# Patient Record
Sex: FEMALE | Race: WHITE | Employment: OTHER
[De-identification: names, ages, dates, MRNs, and addresses within clinical notes are randomized per-mention and may not be internally consistent; named-entity substitution may affect disease eponyms.]

---

## 2017-06-16 ENCOUNTER — SURGERY (OUTPATIENT)
Age: 72
End: 2017-06-16

## 2017-06-16 ENCOUNTER — ANESTHESIA (OUTPATIENT)
Dept: SURGERY | Facility: CLINIC | Age: 72
DRG: 329 | End: 2017-06-16
Payer: MEDICARE

## 2017-06-16 ENCOUNTER — APPOINTMENT (OUTPATIENT)
Dept: CT IMAGING | Facility: CLINIC | Age: 72
DRG: 329 | End: 2017-06-16
Attending: EMERGENCY MEDICINE
Payer: MEDICARE

## 2017-06-16 ENCOUNTER — HOSPITAL ENCOUNTER (INPATIENT)
Facility: CLINIC | Age: 72
LOS: 4 days | Discharge: HOME | DRG: 329 | End: 2017-06-20
Attending: EMERGENCY MEDICINE | Admitting: SURGERY
Payer: MEDICARE

## 2017-06-16 ENCOUNTER — ANESTHESIA EVENT (OUTPATIENT)
Dept: SURGERY | Facility: CLINIC | Age: 72
DRG: 329 | End: 2017-06-16
Payer: MEDICARE

## 2017-06-16 DIAGNOSIS — K35.30 ACUTE APPENDICITIS WITH LOCALIZED PERITONITIS: ICD-10-CM

## 2017-06-16 PROBLEM — K35.32 RUPTURED APPENDICITIS: Status: ACTIVE | Noted: 2017-06-16

## 2017-06-16 LAB
ANION GAP SERPL CALCULATED.3IONS-SCNC: 11 MMOL/L (ref 3–14)
BASOPHILS # BLD AUTO: 0 10E9/L (ref 0–0.2)
BASOPHILS NFR BLD AUTO: 0.1 %
BUN SERPL-MCNC: 15 MG/DL (ref 7–30)
CALCIUM SERPL-MCNC: 8.8 MG/DL (ref 8.5–10.1)
CHLORIDE SERPL-SCNC: 107 MMOL/L (ref 94–109)
CO2 SERPL-SCNC: 23 MMOL/L (ref 20–32)
CREAT SERPL-MCNC: 0.89 MG/DL (ref 0.52–1.04)
DIFFERENTIAL METHOD BLD: ABNORMAL
EOSINOPHIL # BLD AUTO: 0 10E9/L (ref 0–0.7)
EOSINOPHIL NFR BLD AUTO: 0 %
ERYTHROCYTE [DISTWIDTH] IN BLOOD BY AUTOMATED COUNT: 13 % (ref 10–15)
GFR SERPL CREATININE-BSD FRML MDRD: 62 ML/MIN/1.7M2
GLUCOSE SERPL-MCNC: 172 MG/DL (ref 70–99)
HCT VFR BLD AUTO: 41.2 % (ref 35–47)
HGB BLD-MCNC: 14 G/DL (ref 11.7–15.7)
IMM GRANULOCYTES # BLD: 0 10E9/L (ref 0–0.4)
IMM GRANULOCYTES NFR BLD: 0.4 %
LYMPHOCYTES # BLD AUTO: 0.5 10E9/L (ref 0.8–5.3)
LYMPHOCYTES NFR BLD AUTO: 5.5 %
MCH RBC QN AUTO: 32.4 PG (ref 26.5–33)
MCHC RBC AUTO-ENTMCNC: 34 G/DL (ref 31.5–36.5)
MCV RBC AUTO: 95 FL (ref 78–100)
MONOCYTES # BLD AUTO: 0.7 10E9/L (ref 0–1.3)
MONOCYTES NFR BLD AUTO: 9 %
NEUTROPHILS # BLD AUTO: 7 10E9/L (ref 1.6–8.3)
NEUTROPHILS NFR BLD AUTO: 85 %
NRBC # BLD AUTO: 0 10*3/UL
NRBC BLD AUTO-RTO: 0 /100
PLATELET # BLD AUTO: 170 10E9/L (ref 150–450)
POTASSIUM SERPL-SCNC: 3.9 MMOL/L (ref 3.4–5.3)
RADIOLOGIST FLAGS: ABNORMAL
RBC # BLD AUTO: 4.32 10E12/L (ref 3.8–5.2)
SODIUM SERPL-SCNC: 141 MMOL/L (ref 133–144)
WBC # BLD AUTO: 8.3 10E9/L (ref 4–11)

## 2017-06-16 PROCEDURE — 71000012 ZZH RECOVERY PHASE 1 LEVEL 1 FIRST HR: Performed by: SURGERY

## 2017-06-16 PROCEDURE — 40000169 ZZH STATISTIC PRE-PROCEDURE ASSESSMENT I: Performed by: SURGERY

## 2017-06-16 PROCEDURE — 99285 EMERGENCY DEPT VISIT HI MDM: CPT | Mod: 25

## 2017-06-16 PROCEDURE — 25000128 H RX IP 250 OP 636: Performed by: EMERGENCY MEDICINE

## 2017-06-16 PROCEDURE — 25800025 ZZH RX 258: Performed by: SURGERY

## 2017-06-16 PROCEDURE — 27210995 ZZH RX 272: Performed by: SURGERY

## 2017-06-16 PROCEDURE — 25000125 ZZHC RX 250: Performed by: PHYSICIAN ASSISTANT

## 2017-06-16 PROCEDURE — 37000008 ZZH ANESTHESIA TECHNICAL FEE, 1ST 30 MIN: Performed by: SURGERY

## 2017-06-16 PROCEDURE — 25000125 ZZHC RX 250: Performed by: EMERGENCY MEDICINE

## 2017-06-16 PROCEDURE — 25000125 ZZHC RX 250: Performed by: NURSE ANESTHETIST, CERTIFIED REGISTERED

## 2017-06-16 PROCEDURE — 12000007 ZZH R&B INTERMEDIATE

## 2017-06-16 PROCEDURE — 88307 TISSUE EXAM BY PATHOLOGIST: CPT | Mod: 26 | Performed by: SURGERY

## 2017-06-16 PROCEDURE — 25800025 ZZH RX 258: Performed by: EMERGENCY MEDICINE

## 2017-06-16 PROCEDURE — 74177 CT ABD & PELVIS W/CONTRAST: CPT

## 2017-06-16 PROCEDURE — 96361 HYDRATE IV INFUSION ADD-ON: CPT

## 2017-06-16 PROCEDURE — 25000128 H RX IP 250 OP 636: Performed by: PHYSICIAN ASSISTANT

## 2017-06-16 PROCEDURE — 27210794 ZZH OR GENERAL SUPPLY STERILE: Performed by: SURGERY

## 2017-06-16 PROCEDURE — A9270 NON-COVERED ITEM OR SERVICE: HCPCS | Mod: GY | Performed by: PHYSICIAN ASSISTANT

## 2017-06-16 PROCEDURE — 88307 TISSUE EXAM BY PATHOLOGIST: CPT | Performed by: SURGERY

## 2017-06-16 PROCEDURE — 0DTH0ZZ RESECTION OF CECUM, OPEN APPROACH: ICD-10-PCS | Performed by: SURGERY

## 2017-06-16 PROCEDURE — 36000058 ZZH SURGERY LEVEL 3 EA 15 ADDTL MIN: Performed by: SURGERY

## 2017-06-16 PROCEDURE — 0DJD4ZZ INSPECTION OF LOWER INTESTINAL TRACT, PERCUTANEOUS ENDOSCOPIC APPROACH: ICD-10-PCS | Performed by: SURGERY

## 2017-06-16 PROCEDURE — 96374 THER/PROPH/DIAG INJ IV PUSH: CPT | Mod: 59

## 2017-06-16 PROCEDURE — 44160 REMOVAL OF COLON: CPT | Mod: AS | Performed by: PHYSICIAN ASSISTANT

## 2017-06-16 PROCEDURE — 36000056 ZZH SURGERY LEVEL 3 1ST 30 MIN: Performed by: SURGERY

## 2017-06-16 PROCEDURE — 25000125 ZZHC RX 250: Performed by: SURGERY

## 2017-06-16 PROCEDURE — 80048 BASIC METABOLIC PNL TOTAL CA: CPT | Performed by: EMERGENCY MEDICINE

## 2017-06-16 PROCEDURE — 44160 REMOVAL OF COLON: CPT | Performed by: SURGERY

## 2017-06-16 PROCEDURE — 96375 TX/PRO/DX INJ NEW DRUG ADDON: CPT

## 2017-06-16 PROCEDURE — 25000566 ZZH SEVOFLURANE, EA 15 MIN: Performed by: SURGERY

## 2017-06-16 PROCEDURE — S0028 INJECTION, FAMOTIDINE, 20 MG: HCPCS | Performed by: PHYSICIAN ASSISTANT

## 2017-06-16 PROCEDURE — 25000128 H RX IP 250 OP 636: Performed by: NURSE ANESTHETIST, CERTIFIED REGISTERED

## 2017-06-16 PROCEDURE — 25800025 ZZH RX 258: Performed by: PHYSICIAN ASSISTANT

## 2017-06-16 PROCEDURE — 25000128 H RX IP 250 OP 636: Performed by: ANESTHESIOLOGY

## 2017-06-16 PROCEDURE — 37000009 ZZH ANESTHESIA TECHNICAL FEE, EACH ADDTL 15 MIN: Performed by: SURGERY

## 2017-06-16 PROCEDURE — 25000132 ZZH RX MED GY IP 250 OP 250 PS 637: Mod: GY | Performed by: PHYSICIAN ASSISTANT

## 2017-06-16 PROCEDURE — 85025 COMPLETE CBC W/AUTO DIFF WBC: CPT | Performed by: EMERGENCY MEDICINE

## 2017-06-16 RX ORDER — DEXTROSE MONOHYDRATE, SODIUM CHLORIDE, AND POTASSIUM CHLORIDE 50; 1.49; 4.5 G/1000ML; G/1000ML; G/1000ML
INJECTION, SOLUTION INTRAVENOUS CONTINUOUS
Status: DISCONTINUED | OUTPATIENT
Start: 2017-06-16 | End: 2017-06-19

## 2017-06-16 RX ORDER — ONDANSETRON 2 MG/ML
4 INJECTION INTRAMUSCULAR; INTRAVENOUS EVERY 6 HOURS PRN
Status: CANCELLED | OUTPATIENT
Start: 2017-06-16

## 2017-06-16 RX ORDER — SODIUM CHLORIDE, SODIUM LACTATE, POTASSIUM CHLORIDE, CALCIUM CHLORIDE 600; 310; 30; 20 MG/100ML; MG/100ML; MG/100ML; MG/100ML
INJECTION, SOLUTION INTRAVENOUS CONTINUOUS
Status: DISCONTINUED | OUTPATIENT
Start: 2017-06-16 | End: 2017-06-16 | Stop reason: HOSPADM

## 2017-06-16 RX ORDER — NALOXONE HYDROCHLORIDE 0.4 MG/ML
.1-.4 INJECTION, SOLUTION INTRAMUSCULAR; INTRAVENOUS; SUBCUTANEOUS
Status: DISCONTINUED | OUTPATIENT
Start: 2017-06-16 | End: 2017-06-20 | Stop reason: HOSPADM

## 2017-06-16 RX ORDER — DEXTROSE MONOHYDRATE, SODIUM CHLORIDE, AND POTASSIUM CHLORIDE 50; 1.49; 4.5 G/1000ML; G/1000ML; G/1000ML
INJECTION, SOLUTION INTRAVENOUS ONCE
Status: COMPLETED | OUTPATIENT
Start: 2017-06-16 | End: 2017-06-16

## 2017-06-16 RX ORDER — DEXAMETHASONE SODIUM PHOSPHATE 4 MG/ML
INJECTION, SOLUTION INTRA-ARTICULAR; INTRALESIONAL; INTRAMUSCULAR; INTRAVENOUS; SOFT TISSUE PRN
Status: DISCONTINUED | OUTPATIENT
Start: 2017-06-16 | End: 2017-06-16

## 2017-06-16 RX ORDER — HYDROCODONE BITARTRATE AND ACETAMINOPHEN 5; 325 MG/1; MG/1
1-2 TABLET ORAL EVERY 4 HOURS PRN
Status: DISCONTINUED | OUTPATIENT
Start: 2017-06-16 | End: 2017-06-19

## 2017-06-16 RX ORDER — ONDANSETRON 2 MG/ML
4 INJECTION INTRAMUSCULAR; INTRAVENOUS EVERY 30 MIN PRN
Status: DISCONTINUED | OUTPATIENT
Start: 2017-06-16 | End: 2017-06-16 | Stop reason: HOSPADM

## 2017-06-16 RX ORDER — HYDROMORPHONE HYDROCHLORIDE 1 MG/ML
0.2 INJECTION, SOLUTION INTRAMUSCULAR; INTRAVENOUS; SUBCUTANEOUS
Status: CANCELLED | OUTPATIENT
Start: 2017-06-16

## 2017-06-16 RX ORDER — ONDANSETRON 2 MG/ML
INJECTION INTRAMUSCULAR; INTRAVENOUS PRN
Status: DISCONTINUED | OUTPATIENT
Start: 2017-06-16 | End: 2017-06-16

## 2017-06-16 RX ORDER — OXYCODONE AND ACETAMINOPHEN 5; 325 MG/1; MG/1
1-2 TABLET ORAL EVERY 4 HOURS PRN
Status: CANCELLED | OUTPATIENT
Start: 2017-06-16

## 2017-06-16 RX ORDER — MAGNESIUM HYDROXIDE 1200 MG/15ML
LIQUID ORAL PRN
Status: DISCONTINUED | OUTPATIENT
Start: 2017-06-16 | End: 2017-06-16 | Stop reason: HOSPADM

## 2017-06-16 RX ORDER — MULTIPLE VITAMINS W/ MINERALS TAB 9MG-400MCG
1 TAB ORAL DAILY
COMMUNITY

## 2017-06-16 RX ORDER — PROCHLORPERAZINE MALEATE 5 MG
5 TABLET ORAL EVERY 6 HOURS PRN
Status: CANCELLED | OUTPATIENT
Start: 2017-06-16

## 2017-06-16 RX ORDER — PROPOFOL 10 MG/ML
INJECTION, EMULSION INTRAVENOUS PRN
Status: DISCONTINUED | OUTPATIENT
Start: 2017-06-16 | End: 2017-06-16

## 2017-06-16 RX ORDER — HYDROMORPHONE HYDROCHLORIDE 1 MG/ML
0.2 INJECTION, SOLUTION INTRAMUSCULAR; INTRAVENOUS; SUBCUTANEOUS
Status: DISCONTINUED | OUTPATIENT
Start: 2017-06-16 | End: 2017-06-16

## 2017-06-16 RX ORDER — MULTIVITAMIN,THERAPEUTIC
1 TABLET ORAL DAILY
COMMUNITY
End: 2017-06-16

## 2017-06-16 RX ORDER — LIDOCAINE HYDROCHLORIDE 20 MG/ML
INJECTION, SOLUTION INFILTRATION; PERINEURAL PRN
Status: DISCONTINUED | OUTPATIENT
Start: 2017-06-16 | End: 2017-06-16

## 2017-06-16 RX ORDER — ONDANSETRON 4 MG/1
4 TABLET, ORALLY DISINTEGRATING ORAL EVERY 6 HOURS PRN
Status: DISCONTINUED | OUTPATIENT
Start: 2017-06-16 | End: 2017-06-20 | Stop reason: HOSPADM

## 2017-06-16 RX ORDER — HYDROMORPHONE HYDROCHLORIDE 1 MG/ML
.3-.5 INJECTION, SOLUTION INTRAMUSCULAR; INTRAVENOUS; SUBCUTANEOUS EVERY 5 MIN PRN
Status: DISCONTINUED | OUTPATIENT
Start: 2017-06-16 | End: 2017-06-16 | Stop reason: HOSPADM

## 2017-06-16 RX ORDER — FENTANYL CITRATE 50 UG/ML
INJECTION, SOLUTION INTRAMUSCULAR; INTRAVENOUS PRN
Status: DISCONTINUED | OUTPATIENT
Start: 2017-06-16 | End: 2017-06-16

## 2017-06-16 RX ORDER — BUPIVACAINE HYDROCHLORIDE AND EPINEPHRINE 5; 5 MG/ML; UG/ML
INJECTION, SOLUTION PERINEURAL PRN
Status: DISCONTINUED | OUTPATIENT
Start: 2017-06-16 | End: 2017-06-16 | Stop reason: HOSPADM

## 2017-06-16 RX ORDER — HYDROXYZINE HYDROCHLORIDE 10 MG/1
10 TABLET, FILM COATED ORAL EVERY 6 HOURS PRN
Status: DISCONTINUED | OUTPATIENT
Start: 2017-06-16 | End: 2017-06-20 | Stop reason: HOSPADM

## 2017-06-16 RX ORDER — PROCHLORPERAZINE MALEATE 5 MG
5 TABLET ORAL EVERY 6 HOURS PRN
Status: DISCONTINUED | OUTPATIENT
Start: 2017-06-16 | End: 2017-06-20 | Stop reason: HOSPADM

## 2017-06-16 RX ORDER — FENTANYL CITRATE 50 UG/ML
25-50 INJECTION, SOLUTION INTRAMUSCULAR; INTRAVENOUS
Status: DISCONTINUED | OUTPATIENT
Start: 2017-06-16 | End: 2017-06-16 | Stop reason: HOSPADM

## 2017-06-16 RX ORDER — IOPAMIDOL 755 MG/ML
75 INJECTION, SOLUTION INTRAVASCULAR ONCE
Status: COMPLETED | OUTPATIENT
Start: 2017-06-16 | End: 2017-06-16

## 2017-06-16 RX ORDER — AMOXICILLIN 250 MG
1-2 CAPSULE ORAL 2 TIMES DAILY
Status: DISCONTINUED | OUTPATIENT
Start: 2017-06-16 | End: 2017-06-20 | Stop reason: HOSPADM

## 2017-06-16 RX ORDER — ERTAPENEM 1 G/1
1 INJECTION, POWDER, LYOPHILIZED, FOR SOLUTION INTRAMUSCULAR; INTRAVENOUS ONCE
Status: COMPLETED | OUTPATIENT
Start: 2017-06-16 | End: 2017-06-16

## 2017-06-16 RX ORDER — NEOSTIGMINE METHYLSULFATE 1 MG/ML
VIAL (ML) INJECTION PRN
Status: DISCONTINUED | OUTPATIENT
Start: 2017-06-16 | End: 2017-06-16

## 2017-06-16 RX ORDER — GLYCOPYRROLATE 0.2 MG/ML
INJECTION, SOLUTION INTRAMUSCULAR; INTRAVENOUS PRN
Status: DISCONTINUED | OUTPATIENT
Start: 2017-06-16 | End: 2017-06-16

## 2017-06-16 RX ORDER — ONDANSETRON 4 MG/1
4 TABLET, ORALLY DISINTEGRATING ORAL EVERY 30 MIN PRN
Status: DISCONTINUED | OUTPATIENT
Start: 2017-06-16 | End: 2017-06-16 | Stop reason: HOSPADM

## 2017-06-16 RX ORDER — ERTAPENEM 1 G/1
1 INJECTION, POWDER, LYOPHILIZED, FOR SOLUTION INTRAMUSCULAR; INTRAVENOUS EVERY 24 HOURS
Status: DISCONTINUED | OUTPATIENT
Start: 2017-06-17 | End: 2017-06-19

## 2017-06-16 RX ORDER — EPHEDRINE SULFATE 50 MG/ML
INJECTION, SOLUTION INTRAMUSCULAR; INTRAVENOUS; SUBCUTANEOUS PRN
Status: DISCONTINUED | OUTPATIENT
Start: 2017-06-16 | End: 2017-06-16

## 2017-06-16 RX ORDER — METOCLOPRAMIDE HYDROCHLORIDE 5 MG/ML
5 INJECTION INTRAMUSCULAR; INTRAVENOUS EVERY 6 HOURS PRN
Status: CANCELLED | OUTPATIENT
Start: 2017-06-16

## 2017-06-16 RX ORDER — ONDANSETRON 2 MG/ML
4 INJECTION INTRAMUSCULAR; INTRAVENOUS EVERY 6 HOURS PRN
Status: DISCONTINUED | OUTPATIENT
Start: 2017-06-16 | End: 2017-06-20 | Stop reason: HOSPADM

## 2017-06-16 RX ORDER — ONDANSETRON 4 MG/1
4 TABLET, ORALLY DISINTEGRATING ORAL EVERY 6 HOURS PRN
Status: CANCELLED | OUTPATIENT
Start: 2017-06-16

## 2017-06-16 RX ORDER — NALOXONE HYDROCHLORIDE 0.4 MG/ML
.1-.4 INJECTION, SOLUTION INTRAMUSCULAR; INTRAVENOUS; SUBCUTANEOUS
Status: CANCELLED | OUTPATIENT
Start: 2017-06-16

## 2017-06-16 RX ORDER — SODIUM CHLORIDE, SODIUM LACTATE, POTASSIUM CHLORIDE, CALCIUM CHLORIDE 600; 310; 30; 20 MG/100ML; MG/100ML; MG/100ML; MG/100ML
INJECTION, SOLUTION INTRAVENOUS CONTINUOUS
Status: CANCELLED | OUTPATIENT
Start: 2017-06-16

## 2017-06-16 RX ORDER — LIDOCAINE 40 MG/G
CREAM TOPICAL
Status: DISCONTINUED | OUTPATIENT
Start: 2017-06-16 | End: 2017-06-20 | Stop reason: HOSPADM

## 2017-06-16 RX ORDER — METOCLOPRAMIDE 5 MG/1
5 TABLET ORAL EVERY 6 HOURS PRN
Status: CANCELLED | OUTPATIENT
Start: 2017-06-16

## 2017-06-16 RX ORDER — PROCHLORPERAZINE 25 MG
12.5 SUPPOSITORY, RECTAL RECTAL EVERY 12 HOURS PRN
Status: CANCELLED | OUTPATIENT
Start: 2017-06-16

## 2017-06-16 RX ORDER — VITAMIN E 268 MG
400 CAPSULE ORAL DAILY
COMMUNITY

## 2017-06-16 RX ORDER — VECURONIUM BROMIDE 1 MG/ML
INJECTION, POWDER, LYOPHILIZED, FOR SOLUTION INTRAVENOUS PRN
Status: DISCONTINUED | OUTPATIENT
Start: 2017-06-16 | End: 2017-06-16

## 2017-06-16 RX ORDER — ONDANSETRON 2 MG/ML
4 INJECTION INTRAMUSCULAR; INTRAVENOUS ONCE
Status: COMPLETED | OUTPATIENT
Start: 2017-06-16 | End: 2017-06-16

## 2017-06-16 RX ADMIN — PHENYLEPHRINE HYDROCHLORIDE 100 MCG: 10 INJECTION, SOLUTION INTRAMUSCULAR; INTRAVENOUS; SUBCUTANEOUS at 08:21

## 2017-06-16 RX ADMIN — ERTAPENEM SODIUM 1 G: 1 INJECTION, POWDER, LYOPHILIZED, FOR SOLUTION INTRAMUSCULAR; INTRAVENOUS at 05:52

## 2017-06-16 RX ADMIN — SODIUM CHLORIDE, POTASSIUM CHLORIDE, SODIUM LACTATE AND CALCIUM CHLORIDE: 600; 310; 30; 20 INJECTION, SOLUTION INTRAVENOUS at 08:59

## 2017-06-16 RX ADMIN — POTASSIUM CHLORIDE, DEXTROSE MONOHYDRATE AND SODIUM CHLORIDE: 150; 5; 450 INJECTION, SOLUTION INTRAVENOUS at 12:25

## 2017-06-16 RX ADMIN — SODIUM CHLORIDE 63 ML: 9 INJECTION, SOLUTION INTRAVENOUS at 05:15

## 2017-06-16 RX ADMIN — ONDANSETRON 4 MG: 2 SOLUTION INTRAMUSCULAR; INTRAVENOUS at 14:28

## 2017-06-16 RX ADMIN — ROCURONIUM BROMIDE 15 MG: 10 INJECTION INTRAVENOUS at 09:03

## 2017-06-16 RX ADMIN — FENTANYL CITRATE 50 MCG: 50 INJECTION, SOLUTION INTRAMUSCULAR; INTRAVENOUS at 08:34

## 2017-06-16 RX ADMIN — SODIUM CHLORIDE 1000 ML: 9 INJECTION, SOLUTION INTRAVENOUS at 04:26

## 2017-06-16 RX ADMIN — PHENYLEPHRINE HYDROCHLORIDE 100 MCG: 10 INJECTION, SOLUTION INTRAMUSCULAR; INTRAVENOUS; SUBCUTANEOUS at 09:20

## 2017-06-16 RX ADMIN — SODIUM CHLORIDE, POTASSIUM CHLORIDE, SODIUM LACTATE AND CALCIUM CHLORIDE: 600; 310; 30; 20 INJECTION, SOLUTION INTRAVENOUS at 08:13

## 2017-06-16 RX ADMIN — SODIUM CHLORIDE 1000 ML: 900 IRRIGANT IRRIGATION at 08:35

## 2017-06-16 RX ADMIN — SODIUM CHLORIDE 1000 ML: 0.9 IRRIGANT IRRIGATION at 09:25

## 2017-06-16 RX ADMIN — HYDROMORPHONE HYDROCHLORIDE: 10 INJECTION, SOLUTION INTRAMUSCULAR; INTRAVENOUS; SUBCUTANEOUS at 11:36

## 2017-06-16 RX ADMIN — PHENYLEPHRINE HYDROCHLORIDE 100 MCG: 10 INJECTION, SOLUTION INTRAMUSCULAR; INTRAVENOUS; SUBCUTANEOUS at 08:39

## 2017-06-16 RX ADMIN — PROPOFOL 100 MG: 10 INJECTION, EMULSION INTRAVENOUS at 08:16

## 2017-06-16 RX ADMIN — ONDANSETRON 4 MG: 2 SOLUTION INTRAMUSCULAR; INTRAVENOUS at 04:26

## 2017-06-16 RX ADMIN — BUPIVACAINE HYDROCHLORIDE AND EPINEPHRINE BITARTRATE 30 ML: 5; .005 INJECTION, SOLUTION PERINEURAL at 09:41

## 2017-06-16 RX ADMIN — IOPAMIDOL 75 ML: 755 INJECTION, SOLUTION INTRAVENOUS at 05:15

## 2017-06-16 RX ADMIN — VECURONIUM BROMIDE 1 MG: 1 INJECTION, POWDER, LYOPHILIZED, FOR SOLUTION INTRAVENOUS at 09:06

## 2017-06-16 RX ADMIN — SODIUM CHLORIDE 1000 ML: 0.9 IRRIGANT IRRIGATION at 08:35

## 2017-06-16 RX ADMIN — Medication 5 MG: at 08:21

## 2017-06-16 RX ADMIN — SUCCINYLCHOLINE CHLORIDE 60 MG: 20 INJECTION, SOLUTION INTRAMUSCULAR; INTRAVENOUS at 08:16

## 2017-06-16 RX ADMIN — PROPOFOL 50 MG: 10 INJECTION, EMULSION INTRAVENOUS at 09:03

## 2017-06-16 RX ADMIN — PHENYLEPHRINE HYDROCHLORIDE 100 MCG: 10 INJECTION, SOLUTION INTRAMUSCULAR; INTRAVENOUS; SUBCUTANEOUS at 08:27

## 2017-06-16 RX ADMIN — DEXAMETHASONE SODIUM PHOSPHATE 4 MG: 4 INJECTION, SOLUTION INTRA-ARTICULAR; INTRALESIONAL; INTRAMUSCULAR; INTRAVENOUS; SOFT TISSUE at 08:18

## 2017-06-16 RX ADMIN — FENTANYL CITRATE 100 MCG: 50 INJECTION, SOLUTION INTRAMUSCULAR; INTRAVENOUS at 08:16

## 2017-06-16 RX ADMIN — FENTANYL CITRATE 50 MCG: 50 INJECTION, SOLUTION INTRAMUSCULAR; INTRAVENOUS at 09:03

## 2017-06-16 RX ADMIN — LIDOCAINE HYDROCHLORIDE 60 MG: 20 INJECTION, SOLUTION INFILTRATION; PERINEURAL at 08:16

## 2017-06-16 RX ADMIN — ROCURONIUM BROMIDE 20 MG: 10 INJECTION INTRAVENOUS at 08:29

## 2017-06-16 RX ADMIN — NEOSTIGMINE METHYLSULFATE 3 MG: 1 INJECTION INTRAMUSCULAR; INTRAVENOUS; SUBCUTANEOUS at 09:35

## 2017-06-16 RX ADMIN — GLYCOPYRROLATE 0.4 MG: 0.2 INJECTION, SOLUTION INTRAMUSCULAR; INTRAVENOUS at 09:35

## 2017-06-16 RX ADMIN — FAMOTIDINE 20 MG: 10 INJECTION, SOLUTION INTRAVENOUS at 17:11

## 2017-06-16 RX ADMIN — SODIUM CHLORIDE 1000 ML: 0.9 IRRIGANT IRRIGATION at 09:34

## 2017-06-16 RX ADMIN — HYDROMORPHONE HYDROCHLORIDE 0.2 MG: 1 INJECTION, SOLUTION INTRAMUSCULAR; INTRAVENOUS; SUBCUTANEOUS at 04:28

## 2017-06-16 RX ADMIN — ROCURONIUM BROMIDE 15 MG: 10 INJECTION INTRAVENOUS at 08:35

## 2017-06-16 RX ADMIN — SODIUM CHLORIDE 1000 ML: 9 INJECTION, SOLUTION INTRAVENOUS at 06:24

## 2017-06-16 RX ADMIN — FENTANYL CITRATE 50 MCG: 50 INJECTION, SOLUTION INTRAMUSCULAR; INTRAVENOUS at 09:07

## 2017-06-16 RX ADMIN — POTASSIUM CHLORIDE, DEXTROSE MONOHYDRATE AND SODIUM CHLORIDE: 150; 5; 450 INJECTION, SOLUTION INTRAVENOUS at 22:22

## 2017-06-16 RX ADMIN — ONDANSETRON 4 MG: 2 INJECTION INTRAMUSCULAR; INTRAVENOUS at 08:24

## 2017-06-16 RX ADMIN — SENNOSIDES AND DOCUSATE SODIUM 1 TABLET: 8.6; 5 TABLET ORAL at 21:39

## 2017-06-16 RX ADMIN — POTASSIUM CHLORIDE, DEXTROSE MONOHYDRATE AND SODIUM CHLORIDE: 150; 5; 450 INJECTION, SOLUTION INTRAVENOUS at 05:53

## 2017-06-16 RX ADMIN — PROPOFOL 30 MG: 10 INJECTION, EMULSION INTRAVENOUS at 08:33

## 2017-06-16 ASSESSMENT — ENCOUNTER SYMPTOMS
ABDOMINAL PAIN: 1
VOMITING: 1
CONSTIPATION: 0
SEIZURES: 0

## 2017-06-16 ASSESSMENT — ACTIVITIES OF DAILY LIVING (ADL)
TOILETING: 0-->INDEPENDENT
COGNITION: 0 - NO COGNITION ISSUES REPORTED
BATHING: 0-->INDEPENDENT
TRANSFERRING: 0-->INDEPENDENT
DRESS: 0-->INDEPENDENT
SWALLOWING: 0-->SWALLOWS FOODS/LIQUIDS WITHOUT DIFFICULTY
RETIRED_EATING: 0-->INDEPENDENT
AMBULATION: 0-->INDEPENDENT
FALL_HISTORY_WITHIN_LAST_SIX_MONTHS: NO
RETIRED_COMMUNICATION: 0-->UNDERSTANDS/COMMUNICATES WITHOUT DIFFICULTY

## 2017-06-16 ASSESSMENT — LIFESTYLE VARIABLES: TOBACCO_USE: 0

## 2017-06-16 NOTE — BRIEF OP NOTE
General Surgery Brief Operative Note    Pre-operative diagnosis: Acute appendicitis   Post-operative diagnosis Acute ruptured appendicitis   Procedure: Procedure(s):  LAPAROSCOPIC CONVERTED TO OPEN ILEOCECECTOMY - Wound Class: III-Contaminated   Surgeon(s), Assistant(s): Surgeon(s) and Role:     * Matt Malave MD - Primary     * Nara Billy PA-C - Assisting   Estimated blood loss: 50 mL   Drains: None   Specimens:   ID Type Source Tests Collected by Time Destination   A : Ileocecectomy Tissue Other SURGICAL PATHOLOGY EXAM Matt Malave MD 6/16/2017  9:12 AM       Findings: See postop diagnosis   Condition: Stable   Comments:      Nara Billy PA-C See dictated operative report for full details

## 2017-06-16 NOTE — PLAN OF CARE
Problem: Goal Outcome Summary  Goal: Goal Outcome Summary  Outcome: Improving  VSS, bowels hypo, incision reinforced due to oozing, voiding, ambulated in room 1 assist, denies pain

## 2017-06-16 NOTE — ANESTHESIA PREPROCEDURE EVALUATION
Anesthesia Evaluation     . Pt has had prior anesthetic.     No history of anesthetic complications          ROS/MED HX    ENT/Pulmonary:      (-) tobacco use and sleep apnea   Neurologic:      (-) seizures and CVA   Cardiovascular:        (-) hypertension   METS/Exercise Tolerance:     Hematologic:         Musculoskeletal:         GI/Hepatic:     (+) appendicitis,      (-) GERD and liver disease   Renal/Genitourinary:      (-) renal disease   Endo:      (-) Type II DM and thyroid disease   Psychiatric:         Infectious Disease:         Malignancy:         Other:                     Physical Exam  Normal systems: cardiovascular and pulmonary    Airway   Mallampati: II  TM distance: >3 FB  Neck ROM: full    Dental   (+) caps    Cardiovascular       Pulmonary                     Anesthesia Plan      History & Physical Review  History and physical reviewed and following examination; no interval change.    ASA Status:  2 emergent.    NPO Status:  > 8 hours    Plan for General, RSI and ETT with Propofol induction. Maintenance will be Balanced.    PONV prophylaxis:  Ondansetron (or other 5HT-3) and Dexamethasone or Solumedrol       Postoperative Care  Postoperative pain management:  IV analgesics.      Consents  Anesthetic plan, risks, benefits and alternatives discussed with:  Patient..                          .

## 2017-06-16 NOTE — ED PROVIDER NOTES
"  History     Chief Complaint:  Abdominal Pain     HPI   Keara Go is a 71 year old female who presents to the emergency department today for evaluation of abdominal pain. The patient reports that the abdominal pain began on Tuesday and has progressively gotten worse today. She started vomiting last night after dinner.  She has been taking advil for the pain with no significant improvement.The patient denies feeling constipation and has had a few bowel movements in the past few days. She denies major abdominal surgeries in the past, but has had a tubal ligation.     Allergies:  No Known Drug Allergies     Medications:    The patient is currently on no regular medications.    Past Medical History:    History reviewed. No pertinent past medical history.    Past Surgical History:    History reviewed. No pertinent surgical history.    Family History:    History reviewed. No pertinent family history.    Social History:  The patient was accompanied to the ED by  or boyfriend.  Marital Status:  unknown     Review of Systems   Gastrointestinal: Positive for abdominal pain and vomiting. Negative for constipation.   All other systems reviewed and are negative.    Physical Exam   Vitals:  Patient Vitals for the past 24 hrs:   BP Temp Temp src Heart Rate Resp SpO2 Height   06/16/17 0730 - - - 88 18 - -   06/16/17 0725 - - - - 18 - -   06/16/17 0620 115/49 - - - - 93 % -   06/16/17 0600 121/60 - - 89 16 94 % -   06/16/17 0554 111/56 99.5  F (37.5  C) Oral 88 16 93 % -   06/16/17 0349 103/58 98.5  F (36.9  C) Oral 96 16 96 % 1.676 m (5' 6\")     Physical Exam  General: Resting comfortably on the gurney  Head:  The scalp, face, and head appear normal  Eyes:  The pupils are equal, round, and reactive to light    There is no nystagmus    Extraocular muscles are intact    Conjunctivae and sclerae are normal  ENT:    The nose is normal    Pinnae are normal    The oropharynx is normal    Uvula is in the " midline  Neck:  Normal range of motion    There is no rigidity noted    There is no midline cervical spine pain/tenderness    Trachea is in the midline    No mass is detected  CV:  Regular rate and underlying rhythm     Normal S1/S2, no S3/S4    No pathological murmur detected  Resp:  Lungs are clear    There is no tachypnea    Non-labored    No rales    No wheezing   GI:  Abdomen is soft, there is no rigidity    Mild distension    Mild tympani    Rebound tenderness, RLQ    There is pain at McBurney's Point   MS:  Normal muscular tone    Symmetric motor strength    No major joint effusions    No asymmetric leg swelling, no calf tenderness  Skin:  No rash or acute skin lesions noted  Neuro: Speech is normal and fluent  Psych:  Awake. Alert.      Normal affect.  Appropriate interactions.  Lymph: No anterior cervical lymphadenopathy noted    Emergency Department Course     Imaging:  Radiology findings were communicated with the patient who voiced understanding of the findings.  CT Abdomen Pelvis w Contrast  IMPRESSION: Ruptured acute appendicitis with a small amount of free  intraperitoneal gas and fluid. No loculated fluid collection to  suggest abscess.  Report per radiology     Laboratory:  Laboratory findings were communicated with the patient who voiced understanding of the findings.  CBC: WNL. (WBC 8.3, HGB 14.0, )   BMP: Glucose 172(H) o/w WNL (Creatinine 0.89)    Interventions:  0426 Zofran 4mg IV  0426 Zofran 4mg IV  0428 Dilaudid 0.2mg IV   0552 Invanz 1g IV  0553 Dextrose 5% + 0.45% NaCl + KCl 40mEq/L IV  0624 NS 1,000mL IV     Emergency Department Course:  Nursing notes and vitals reviewed.  The patient was sent for a CT Abdomen Pelvis w Contrast while in the emergency department, results above.   IV was inserted and blood was drawn for laboratory testing, results above.  I performed an exam of the patient as documented above.   At 0541 I spoke with Dr. Gallardo for consult of the patient's case  regarding plan for surgery.  I personally reviewed the laboratory results with the patient and answered all related questions prior to admission.     Impression & Plan      Medical Decision Making:  Keara Go presented with right lower quadrant abdominal pain and the CT scan confirms appendicitis.  There is evidence of rupture at this time. Pain has been controlled with interventions in the Emergency Department.  Parenteral antibiotics have been administered in the Emergency Department, in anticipation of surgery. The case was discussed with the on call surgeon, Dr. Isaias Gallardo, and she will be going to the operating room for appendectomy.  An OBS bed has been arranged prior to surgery.    Diagnosis:    ICD-10-CM    1. Acute appendicitis with localized peritonitis K35.3      Disposition:   Admitted to hospital for surgery.    Scribe Disclosure:  I, Timothy Barraza, am serving as a scribe at 4:06 AM on 6/16/2017 to document services personally performed by Isaias Cole MD, based on my observations and the provider's statements to me.  6/16/2017    EMERGENCY DEPARTMENT       Isaias Cole MD  06/16/17 1011

## 2017-06-16 NOTE — ED NOTES
"[facility]  ED Nurse Handoff Report    ED Chief complaint: Abdominal Pain (lower abdominal pain X 2 days, vomiting X 1 day, BM yesterday)      ED Diagnosis:   Final diagnoses:   Acute appendicitis with localized peritonitis       Code Status: Not addressed in ED    Allergies: No Known Allergies    Activity level - Baseline/Home:  Independent    Activity Level - Current:   Independent     Needed?: No    Isolation: No  Infection: Not Applicable    Bariatric?: No    Vital Signs:   Vitals:    06/16/17 0349 06/16/17 0554   BP: 103/58 111/56   Resp: 16 16   Temp: 98.5  F (36.9  C) 99.5  F (37.5  C)   TempSrc: Oral Oral   SpO2: 96% 93%   Height: 1.676 m (5' 6\")        Cardiac Rhythm: ,        Pain level: 0-10 Pain Scale: 0    Is this patient confused?: No    Patient Report: Initial Complaint: 71 year old female who presents to the emergency department today for evaluation of abdominal pain. The patient reports that the the abdominal pain began on Tuesday and has progressively gotten worse today. She started vomiting last night after dinner. The patient denies feeling constipation and has had a few bowel movements.  Focused Assessment: Pt c/o abdominal pain across middle abdomen.   Tests Performed: Labs, CT  Abnormal Results: CT+   Treatments provided: IV dilaudid given, NS bolus, Invanz infusing, D5 + 0.45%NS + KCL 20 infusing    Family Comments:  at bedside, supportive    OBS brochure/video discussed/provided to patient: N/A - yes    ED Medications:   Medications   HYDROmorphone (PF) (DILAUDID) injection 0.2 mg (0.2 mg Intravenous Given 6/16/17 0428)   ertapenem (INVanz) 1 g vial to attach to  mL bag (1 g Intravenous New Bag 6/16/17 0552)   0.9% sodium chloride BOLUS (1,000 mLs Intravenous New Bag 6/16/17 0426)   ondansetron (ZOFRAN) injection 4 mg (4 mg Intravenous Given 6/16/17 0426)   iopamidol (ISOVUE-370) solution 75 mL (75 mLs Intravenous Given 6/16/17 1115)   sodium chloride " 0.9 % for CT scan flush dose 63 mL (63 mLs Intravenous Given 6/16/17 2415)   dextrose 5% and 0.45% NaCl + KCl 20 mEq/L infusion ( Intravenous New Bag 6/16/17 0595)       Drips infusing?:  No      ED NURSE PHONE NUMBER: [phone]

## 2017-06-16 NOTE — PHARMACY-ADMISSION MEDICATION HISTORY
Admission medication history interview status for the 6/16/2017  admission is complete. See EPIC admission navigator for prior to admission medications     Medication history source reliability:Good    Actions taken by pharmacist (provider contacted, etc):None     Additional medication history information not noted on PTA med list :None    Medication reconciliation/reorder completed by provider prior to medication history? Yes    Time spent in this activity: 10 mins    Prior to Admission medications    Medication Sig Last Dose Taking? Auth Provider   Apoaequorin (PREVAGEN PO) Take 1 capsule by mouth daily  6/15/2017 at am Yes Reported, Patient   Omega-3 Fatty Acids (FISH OIL PO) Take 1 capsule by mouth daily  6/15/2017 at am Yes Reported, Patient   multivitamin, therapeutic with minerals (MULTI-VITAMIN) TABS tablet Take 1 tablet by mouth daily 6/15/2017 at am Yes Unknown, Entered By History   vitamin E 400 UNIT capsule Take 400 Units by mouth daily 6/15/2017 at am Yes Unknown, Entered By History     Clara Bond

## 2017-06-16 NOTE — ANESTHESIA CARE TRANSFER NOTE
Patient: Keara Go    Procedure(s):  LAPAROSCOPIC CONVERTED TO OPEN ILEOCECECTOMY - Wound Class: III-Contaminated    Diagnosis: acute appendicitis  Diagnosis Additional Information: No value filed.    Anesthesia Type:   General, RSI, ETT     Note:  Airway :Face Mask  Patient transferred to:PACU        Vitals: (Last set prior to Anesthesia Care Transfer)    CRNA VITALS  6/16/2017 0930 - 6/16/2017 1008      6/16/2017             Pulse: 83    SpO2: 100 %    Resp Rate (observed): 14                Electronically Signed By: ISABELLE Garrison CRNA  June 16, 2017  10:08 AM

## 2017-06-16 NOTE — ANESTHESIA POSTPROCEDURE EVALUATION
Patient: Keara Go    Procedure(s):  LAPAROSCOPIC CONVERTED TO OPEN ILEOCECECTOMY - Wound Class: III-Contaminated    Diagnosis:acute appendicitis  Diagnosis Additional Information: No value filed.    Anesthesia Type:  General, RSI, ETT    Note:  Anesthesia Post Evaluation    Patient location during evaluation: PACU  Patient participation: Able to fully participate in evaluation  Level of consciousness: awake and alert  Pain management: satisfactory to patient  Airway patency: patent  Cardiovascular status: hemodynamically stable  Respiratory status: acceptable and unassisted  Hydration status: balanced  PONV: none     Anesthetic complications: None          Last vitals:  Vitals:    06/16/17 1140 06/16/17 1157 06/16/17 1301   BP:  104/57 110/58   Resp: 18 20 18   Temp:  36.8  C (98.2  F)    SpO2: 96% 96% 97%         Electronically Signed By: Manny Newton MD  June 16, 2017  1:53 PM

## 2017-06-16 NOTE — IP AVS SNAPSHOT
MRN:[id number]                      After Visit Summary   6/16/2017    Keara Go    MRN: [id number]           Thank you!     Thank you for choosing [facility] for your care. Our goal is always to provide you with excellent care. Hearing back from our patients is one way we can continue to improve our services. Please take a few minutes to complete the written survey that you may receive in the mail after you visit with us. Thank you!        Patient Information     Date Of Birth          1945        Designated Caregiver       Most Recent Value    Caregiver    Will someone help with your care after discharge? yes    Name of designated caregiver Lucio    Phone number of caregiver [phone]    Caregiver address same as pt      About your hospital stay     You were admitted on:  June 16, 2017 You last received care in the:  [place] [address] [facility]    You were discharged on:  June 20, 2017        Reason for your hospital stay       Ruptured appendix                  Who to Call     For medical emergencies, please call 911.  For non-urgent questions about your medical care, please call your primary care provider or clinic, None  For questions related to your surgery, please call your surgery clinic        Attending Provider     Provider Specialty    Isaias Cole MD Emergency Medicine    Sami, Isaias Miner MD Surgery    Matt Malave MD General Surgery       Primary Care Provider    None Specified      After Care Instructions     Activity       Your activity upon discharge: activity as tolerated. No heavy lifting > 20 lbs or strenuous exercise x 3-4 weeks. No driving or alcohol while on pain meds.            Diet       Follow this diet upon discharge: low residue diet            Supplies       List the supplies the pt needs to go home: binder            Wound care and dressings       Instructions to care for your wound at home: keep wound(s) clean and dry.  You may shower, but do not soak incisions x 2 weeks. Leave staples in place. Apply dry dressing as needed.]                  Follow-up Appointments     Follow-up and recommended labs and tests        Follow up with Dr. Malave in 7-10 days. Your staples will be removed at that time. Call [phone] to schedule an appointment or if you have any concerns. We are located at [address] [place] [address].                  Further instructions from your care team       Discharge Instructions following Bowel Surgery  [facility]    Diet:    As tolerated or as directed by your doctor.      Avoid gas-forming foods (such as cabbage, broccoli, onions, etc.)    Drink plenty of fluids.  Activity:    As tolerated. No heavy lifting greater than 10-15 lbs for 6 weeks.    Take frequent, short walks.    Do not drive while taking narcotic pain medications.  Care of your incision:    Ok to shower.  No tub baths or swimming until incisions are healed.    No lotions, powders or perfumes on or near incisions.    Do not remove steri-strips (white tape) as they will fall off on their own in 7-10 days.    If present, staples will be removed by the doctor in the office.  What to expect:    Feeling tired (take frequent rest periods).    Diminished appetite (eat small, frequent meals).  Call your physician ([phone]) if these signs and symptoms develop:    Fever/chills greater than 100.4 oF (taken under the tongue).    Foul smelling or green drainage from the incision.    Heavy, bright red bleeding from rectum or incision.    Pain not controlled with rest and pain medications.    Constipation lasting longer than 3 days.    With any questions or concerns.      Pending Results     No orders found from 6/14/2017 to 6/17/2017.            Admission Information     Date & Time Provider Department Dept. Phone    6/16/2017 Matt Malave MD [place] [address] [facility] [phone]      Your Vitals Were     Blood Pressure Pulse Temperature Respirations Height Weight    123/64 76 98.7  F (37.1  C) (Oral) 16 1.676 m (5' 6\") 74.3 kg (163 lb 12.8 oz)    Pulse Oximetry BMI (Body Mass Index)                93% 26.44 kg/m2          My[employer] Information     [employer] lets you send messages to your doctor, view your test results, renew your prescriptions, schedule appointments and more. To sign up, go to [email or web address] . Click on \"Log in\" on the left side of the screen, which will take you to the Welcome page. Then click on \"Sign up Now\" on the right side of the page.     You will be asked to enter the access code listed below, as well as some personal information. Please follow the directions to create your username and password.     Your access code is: [email or web address]  Expires: 2017 10:57 AM     Your access code will  in 90 days. If you need help or a new code, please call your [facility] clinic or [phone].        Care EveryWhere ID     This is your Care EveryWhere ID. This could be used by other organizations to access your [facility] medical records  [id number]           Review of your medicines      START taking        Dose / Directions    amoxicillin-clavulanate 875-125 MG per tablet   Commonly known as:  AUGMENTIN   Indication:  Infection Within the Abdomen   Used for:  Acute appendicitis with localized peritonitis        Dose:  1 tablet   Take 1 tablet by mouth every 12 hours   Quantity:  13 tablet   Refills:  0       traMADol 50 MG tablet   Commonly known as:  ULTRAM   Used for:  Acute appendicitis with localized peritonitis        Dose:  50 mg   Take 1 tablet (50 mg) by mouth every 6 hours as needed for moderate pain   Quantity:  15 tablet   Refills:  0         CONTINUE these medicines which have NOT CHANGED        Dose / Directions    FISH OIL PO        Dose:  1 capsule   Take 1 capsule by mouth daily   Refills:  0       Multi-vitamin Tabs tablet "        Dose:  1 tablet   Take 1 tablet by mouth daily   Refills:  0       PREVAGEN PO        Dose:  1 capsule   Take 1 capsule by mouth daily   Refills:  0       vitamin E 400 UNIT capsule        Dose:  400 Units   Take 400 Units by mouth daily   Refills:  0            Where to get your medicines      These medications were sent to [facility] - [place], [place] - [address]  [address] [place] [address] [address]     Phone:  [phone]     amoxicillin-clavulanate 875-125 MG per tablet         Some of these will need a paper prescription and others can be bought over the counter. Ask your nurse if you have questions.     Bring a paper prescription for each of these medications     traMADol 50 MG tablet                Protect others around you: Learn how to safely use, store and throw away your medicines at www.disposemymeds.org.             Medication List: This is a list of all your medications and when to take them. Check marks below indicate your daily home schedule. Keep this list as a reference.      Medications           Morning Afternoon Evening Bedtime As Needed    amoxicillin-clavulanate 875-125 MG per tablet   Commonly known as:  AUGMENTIN   Take 1 tablet by mouth every 12 hours   Last time this was given:  1 tablet on 6/20/2017  9:53 AM                                      FISH OIL PO   Take 1 capsule by mouth daily                                   Multi-vitamin Tabs tablet   Take 1 tablet by mouth daily                                   PREVAGEN PO   Take 1 capsule by mouth daily                                   traMADol 50 MG tablet   Commonly known as:  ULTRAM   Take 1 tablet (50 mg) by mouth every 6 hours as needed for moderate pain                                   vitamin E 400 UNIT capsule   Take 400 Units by mouth daily

## 2017-06-16 NOTE — IP AVS SNAPSHOT
[facility] [address] [facility]    [address] [place] [address]    Phone:  [phone]                                       After Visit Summary   6/16/2017    Keara Go    MRN: [id number]           After Visit Summary Signature Page     I have received my discharge instructions, and my questions have been answered. I have discussed any challenges I see with this plan with the nurse or doctor.    ..........................................................................................................................................  Patient/Patient Representative Signature      ..........................................................................................................................................  Patient Representative Print Name and Relationship to Patient    ..................................................               ................................................  Date                                            Time    ..........................................................................................................................................  Reviewed by Signature/Title    ...................................................              ..............................................  Date                                                            Time

## 2017-06-16 NOTE — ED NOTES
Called surgery to give report to Arabella GORDON  Patient on way to surgery.  Pre op check list completed.

## 2017-06-17 LAB
ANION GAP SERPL CALCULATED.3IONS-SCNC: 8 MMOL/L (ref 3–14)
BUN SERPL-MCNC: 14 MG/DL (ref 7–30)
CALCIUM SERPL-MCNC: 8.4 MG/DL (ref 8.5–10.1)
CHLORIDE SERPL-SCNC: 108 MMOL/L (ref 94–109)
CO2 SERPL-SCNC: 23 MMOL/L (ref 20–32)
CREAT SERPL-MCNC: 0.74 MG/DL (ref 0.52–1.04)
ERYTHROCYTE [DISTWIDTH] IN BLOOD BY AUTOMATED COUNT: 13.3 % (ref 10–15)
GFR SERPL CREATININE-BSD FRML MDRD: 77 ML/MIN/1.7M2
GLUCOSE SERPL-MCNC: 115 MG/DL (ref 70–99)
HCT VFR BLD AUTO: 34.7 % (ref 35–47)
HGB BLD-MCNC: 11.7 G/DL (ref 11.7–15.7)
MCH RBC QN AUTO: 32.2 PG (ref 26.5–33)
MCHC RBC AUTO-ENTMCNC: 33.7 G/DL (ref 31.5–36.5)
MCV RBC AUTO: 96 FL (ref 78–100)
PLATELET # BLD AUTO: 147 10E9/L (ref 150–450)
POTASSIUM SERPL-SCNC: 3.9 MMOL/L (ref 3.4–5.3)
RBC # BLD AUTO: 3.63 10E12/L (ref 3.8–5.2)
SODIUM SERPL-SCNC: 139 MMOL/L (ref 133–144)
WBC # BLD AUTO: 6.3 10E9/L (ref 4–11)

## 2017-06-17 PROCEDURE — 12000007 ZZH R&B INTERMEDIATE

## 2017-06-17 PROCEDURE — 25000125 ZZHC RX 250: Performed by: PHYSICIAN ASSISTANT

## 2017-06-17 PROCEDURE — 25800025 ZZH RX 258: Performed by: PHYSICIAN ASSISTANT

## 2017-06-17 PROCEDURE — A9270 NON-COVERED ITEM OR SERVICE: HCPCS | Mod: GY | Performed by: PHYSICIAN ASSISTANT

## 2017-06-17 PROCEDURE — S0028 INJECTION, FAMOTIDINE, 20 MG: HCPCS | Performed by: PHYSICIAN ASSISTANT

## 2017-06-17 PROCEDURE — 36415 COLL VENOUS BLD VENIPUNCTURE: CPT | Performed by: PHYSICIAN ASSISTANT

## 2017-06-17 PROCEDURE — 25000128 H RX IP 250 OP 636: Performed by: PHYSICIAN ASSISTANT

## 2017-06-17 PROCEDURE — 80048 BASIC METABOLIC PNL TOTAL CA: CPT | Performed by: PHYSICIAN ASSISTANT

## 2017-06-17 PROCEDURE — 25000132 ZZH RX MED GY IP 250 OP 250 PS 637: Mod: GY | Performed by: PHYSICIAN ASSISTANT

## 2017-06-17 PROCEDURE — 85027 COMPLETE CBC AUTOMATED: CPT | Performed by: PHYSICIAN ASSISTANT

## 2017-06-17 RX ORDER — HYDROMORPHONE HYDROCHLORIDE 1 MG/ML
.2-.4 INJECTION, SOLUTION INTRAMUSCULAR; INTRAVENOUS; SUBCUTANEOUS
Status: DISCONTINUED | OUTPATIENT
Start: 2017-06-17 | End: 2017-06-20 | Stop reason: HOSPADM

## 2017-06-17 RX ORDER — HEPARIN SODIUM 5000 [USP'U]/.5ML
5000 INJECTION, SOLUTION INTRAVENOUS; SUBCUTANEOUS EVERY 12 HOURS
Status: DISCONTINUED | OUTPATIENT
Start: 2017-06-17 | End: 2017-06-20 | Stop reason: HOSPADM

## 2017-06-17 RX ADMIN — HYDROCODONE BITARTRATE AND ACETAMINOPHEN 1 TABLET: 5; 325 TABLET ORAL at 09:49

## 2017-06-17 RX ADMIN — HYDROCODONE BITARTRATE AND ACETAMINOPHEN 1 TABLET: 5; 325 TABLET ORAL at 19:21

## 2017-06-17 RX ADMIN — SENNOSIDES AND DOCUSATE SODIUM 1 TABLET: 8.6; 5 TABLET ORAL at 09:49

## 2017-06-17 RX ADMIN — POTASSIUM CHLORIDE, DEXTROSE MONOHYDRATE AND SODIUM CHLORIDE: 150; 5; 450 INJECTION, SOLUTION INTRAVENOUS at 21:27

## 2017-06-17 RX ADMIN — HYDROCODONE BITARTRATE AND ACETAMINOPHEN 1 TABLET: 5; 325 TABLET ORAL at 14:56

## 2017-06-17 RX ADMIN — FAMOTIDINE 20 MG: 10 INJECTION, SOLUTION INTRAVENOUS at 19:21

## 2017-06-17 RX ADMIN — POTASSIUM CHLORIDE, DEXTROSE MONOHYDRATE AND SODIUM CHLORIDE: 150; 5; 450 INJECTION, SOLUTION INTRAVENOUS at 11:30

## 2017-06-17 RX ADMIN — HEPARIN SODIUM 5000 UNITS: 5000 INJECTION, SOLUTION INTRAVENOUS; SUBCUTANEOUS at 11:24

## 2017-06-17 RX ADMIN — ERTAPENEM SODIUM 1 G: 1 INJECTION, POWDER, LYOPHILIZED, FOR SOLUTION INTRAMUSCULAR; INTRAVENOUS at 08:23

## 2017-06-17 RX ADMIN — SENNOSIDES AND DOCUSATE SODIUM 2 TABLET: 8.6; 5 TABLET ORAL at 20:40

## 2017-06-17 RX ADMIN — FAMOTIDINE 20 MG: 10 INJECTION, SOLUTION INTRAVENOUS at 06:47

## 2017-06-17 NOTE — PLAN OF CARE
Problem: Goal Outcome Summary  Goal: Goal Outcome Summary  Outcome: Improving  VSS, bowels hypo, -flatus, dressing dried drainage-unchanged, voiding adequately, family will stay overnight to help as pt is very forgetful

## 2017-06-17 NOTE — PLAN OF CARE
Problem: Goal Outcome Summary  Goal: Goal Outcome Summary  Outcome: No Change  Pt A/O but frequently confused to time, situation. Pt pulled IV's x 3 overnight, new IV started, CDI, pt refused for IV fluids/meds to be connected to it.  Attendant at beside.  VSS ex 3L O2 NC needed to keep sats > 90 while sleeping.  Denies pain, PCA dilaudid available. Drainage expanded, re-marked at start of shift, no change during shift. Dim LS in bases.  BS hypoactive, no flatus, voiding well. NPO, w/ice chips, meds. Up w/Asst 1.

## 2017-06-17 NOTE — PROGRESS NOTES
Pt very confused, spoke with  and family plans to be here about 9am, pt refusing supplemental oxygen and refusing to have her IV hooked back up.

## 2017-06-17 NOTE — PLAN OF CARE
Problem: Goal Outcome Summary  Goal: Goal Outcome Summary  Outcome: No Change  Disoriented to time and situation, easily reoriented. VSS. LS diminished in the bases, TCDB and IS encouraged. BS hypoactive, denies passing flatus. Remains NPO with ice chips and meds.  Voiding. CMS intact. Incision dressing intact with no change in marked drainage. Pain managed with PCA. Will continue to monitor.

## 2017-06-17 NOTE — OP NOTE
PREOPERATIVE DIAGNOSIS:  Perforated acute appendicitis.      POSTOPERATIVE DIAGNOSIS:  Perforated acute appendicitis with significant inflammatory changes in right lower quadrant secondary to perforated retrocecal appendicitis.      PROCEDURE:  Laparoscopic converted to subsequent open ileocecal resection.      SURGEON:  Matt Malave MD      ASSISTANT:  Nara Billy PA-C.  A KAIL[employer] Assist was necessary in this procedure for expertise in patient prepping, draping, positioning, camera operation, retraction, exposure, bowel resection, anastomosis and fascial and incisional closure.      ANESTHESIA:  General endotracheal.      ESTIMATED BLOOD LOSS:  50 cc.      DRAINS:  None.      COMPLICATIONS:  None.      SPECIMENS:  Ileocecal resection.      OPERATIVE INDICATIONS:  The patient Keara Go is a 71-year-old female who presented to [facility] after several days of abdominal pain.  She was evaluated in the Emergency Department and had a CT scan of the abdomen and pelvis which was consistent with perforated appendicitis.  I met with the patient and discussed the indication for surgery.  The potential risks of bleeding, infection, visceral injury, conversion to open surgery were all thoroughly discussed with her.  Her questions were all answered, and she consented to proceed.      DESCRIPTION OF PROCEDURE:  After Informed Consent was obtained from the patient she was taken to the operating room on 06/16/2017 where she was placed supine on the operating table.  Following the induction of adequate general endotracheal anesthesia the patient's abdomen was prepped and draped in the usual fashion.  A surgeon-initiated time-out was completed.  The patient had already received 1 gm of IV Invanz in the Emergency Department.        After the infiltration of local anesthetic a curvilinear supraumbilical incision was made, and a Veress needle was introduced.  Intraperitoneal position of the needle was  confirmed using the saline drop test.  A carbon dioxide pneumoperitoneum was established.  The Veress needle was then removed and replaced with a 12 mm trocar.        After the infiltration of local anesthetic and under direct vision 5 mm ports were placed in the suprapubic area and the left lower abdomen.  There was moderate distention of the visualized portions of bowel.  There was dense inflammatory reaction in the right lower quadrant with feculent-stained fluid along the right pericolic gutter.  I exposed the cecum, and it was extremely thickened and abnormal in appearance.  The appendix appeared to be sitting in a retrocecal position.  I mobilized the right colon using electrocautery and blunt dissection.  I continued to try to mobilize and visualize the appendiceal base.  There was a visualized perforation basically at or just beyond the base of the appendix.  This caused significant inflammation along the cecum.  The inflammation tracked along the right pericolic gutter.  I further mobilized the distal small bowel using electrocautery.        Further attempts to try to either perform a simple appendectomy or cecectomy were unsuccessful.  Due to the compromised anatomy and significant inflammatory changes I elected to convert to an open procedure.  A low midline laparotomy incision was made, and dissection was carried through the subcutaneous tissues.  Dissection was carried down to the level of fascia, the fascia was divided, and the peritoneal cavity was entered.  The cecum, the majority of the ascending colon and the terminal ileum were all delivered into the wound.  The appendix was severely inflamed and ruptured.        I elected to perform an ileocecal cecectomy for resection.  Proximal and distal lines of resection were identified, and the bowel was divided using the stapling instrument.  The intervening mesentery was also taken down with the stapling instrument.  The bowel was then brought together in  a side-to-side functional end-to-end fashion creating an anastomosis with the 45 mm stapler.  The enterotomies for the stapler were oversewn in a 2-layer fashion using 3-0 Vicryl suture.  The mesenteric defect was closed with 3-0 Vicryl suture.        The viscera were returned to the abdominal cavity.  The abdominal cavity was copiously lavaged until the effluent was essentially clear.  The transverse colon and greater omentum were brought down over the anterior surface of the abdominal contents.  The midline fascia was closed using running 0 Maxon suture.  The wound was irrigated and closed with staples.  The remaining trocar sites were closed with staples.  At the conclusion of the case sponge, needle and instrument counts were correct.  The patient tolerated the procedures well.  She was extubated in the operating room and taken to the Recovery Room in stable condition.         RENE SANCHES MD             D: 2017 14:52   T: 2017 23:20   MT: [id number]      Name:     BRITNEY ASHBY   MRN:      [id number]        Account:        [id number]   :      1945           Procedure Date: 2017      Document: [id number]

## 2017-06-17 NOTE — PROGRESS NOTES
"General Surgery Progress Note    Admission Date: 6/16/2017  Today's Date: 6/17/2017         Assessment:      Keara Go is a 71 year old female   S/P laparoscopic converted to open ileocecal resection  POD #1     Diagnosis: perforated retrocecal appendicitis         Plan:   Continue NPO with chips and meds. Wait to advance diet until some return of bowel function. Will transition to Norco for pain, IV dilaudid available for breakthrough.    Bowel: rest   Antibiotics: continue Invanz, transition to PO abx for discharge  IVFs: D5 and 0.45NaCl + KCl20 @ 100ml/hr  DVT prophylaxis: PCDs, work on ambulation, recommend starting heparin later today if not ambulating much     Dispo: continue to monitor, expect to stay through weekend        Interval History:   Afebrile overnight, requiring 2-3L O2 via NC to keep sats up. VSS, blood pressures slightly soft at times but seems baseline for her. Confused at times, not using PCA much but does report pain. No flatus. Has not ambulated much. Recommended to patient and her family starting heparin for dvt prophylaxis. Patient's family very hesitant for her to \"get poked\" one more time, patient did not feel strongly either way. Discussed with them the risk of DVT/PE postoperatively and that her risk is relatively low, but still higher given recent surgery and her decreased mobility. They would like to discuss it and may refuse the heparin later if desired. I strongly recommended that she work on ambulation 5-6 times a day in the halls as soon as tolerated especially if not taking the anticoagulation.          Physical Exam:   /57 (BP Location: Right arm)  Pulse 77  Temp 98.4  F (36.9  C) (Oral)  Resp 16  Ht 1.676 m (5' 6\")  SpO2 (!) 88%  I/O last 3 completed shifts:  In: 2073 [P.O.:25; I.V.:2048]  Out: 650 [Urine:600; Blood:50]  General: NAD, pleasant, alert, sitting up in chair  Cardiovascular: regular rate and rhythm; S1 and S2 distinct without murmur   Respiratory: " lungs clear to auscultation without wheezes, rales or rhonchi   Abdomen: soft, minimally tender, non distended, no bowel sounds appreciated  Incision: clean, dry, and intact with staples in place, dried drainage on dressing  Extremities: no edema, no calf tenderness    LABS:  Recent Labs   Lab Test  06/17/17   0847  06/16/17   0415   WBC  6.3  8.3   HGB  11.7  14.0   MCV  96  95   PLT  147*  170      Recent Labs   Lab Test  06/17/17   0847  06/16/17   0415   POTASSIUM  3.9  3.9   CHLORIDE  108  107   CO2  23  23   BUN  14  15   CR  0.74  0.89   ANIONGAP  8  11            Linnea Suarez PA-C  [facility]: [phone]  Pager: [phone]@[email or web address] (7am-5pm)

## 2017-06-18 LAB — GLUCOSE BLDC GLUCOMTR-MCNC: 145 MG/DL (ref 70–99)

## 2017-06-18 PROCEDURE — 25000125 ZZHC RX 250: Performed by: PHYSICIAN ASSISTANT

## 2017-06-18 PROCEDURE — A9270 NON-COVERED ITEM OR SERVICE: HCPCS | Mod: GY | Performed by: PHYSICIAN ASSISTANT

## 2017-06-18 PROCEDURE — 25000132 ZZH RX MED GY IP 250 OP 250 PS 637: Mod: GY | Performed by: PHYSICIAN ASSISTANT

## 2017-06-18 PROCEDURE — 25000128 H RX IP 250 OP 636: Performed by: PHYSICIAN ASSISTANT

## 2017-06-18 PROCEDURE — 25800025 ZZH RX 258: Performed by: PHYSICIAN ASSISTANT

## 2017-06-18 PROCEDURE — 12000007 ZZH R&B INTERMEDIATE

## 2017-06-18 PROCEDURE — 00000146 ZZHCL STATISTIC GLUCOSE BY METER IP

## 2017-06-18 PROCEDURE — S0028 INJECTION, FAMOTIDINE, 20 MG: HCPCS | Performed by: PHYSICIAN ASSISTANT

## 2017-06-18 RX ADMIN — FAMOTIDINE 20 MG: 10 INJECTION, SOLUTION INTRAVENOUS at 06:22

## 2017-06-18 RX ADMIN — HYDROCODONE BITARTRATE AND ACETAMINOPHEN 2 TABLET: 5; 325 TABLET ORAL at 11:10

## 2017-06-18 RX ADMIN — HYDROCODONE BITARTRATE AND ACETAMINOPHEN 2 TABLET: 5; 325 TABLET ORAL at 16:04

## 2017-06-18 RX ADMIN — ERTAPENEM SODIUM 1 G: 1 INJECTION, POWDER, LYOPHILIZED, FOR SOLUTION INTRAMUSCULAR; INTRAVENOUS at 10:17

## 2017-06-18 RX ADMIN — POTASSIUM CHLORIDE, DEXTROSE MONOHYDRATE AND SODIUM CHLORIDE: 150; 5; 450 INJECTION, SOLUTION INTRAVENOUS at 06:22

## 2017-06-18 RX ADMIN — HYDROCODONE BITARTRATE AND ACETAMINOPHEN 1 TABLET: 5; 325 TABLET ORAL at 21:02

## 2017-06-18 RX ADMIN — FAMOTIDINE 20 MG: 10 INJECTION, SOLUTION INTRAVENOUS at 18:09

## 2017-06-18 RX ADMIN — SENNOSIDES AND DOCUSATE SODIUM 2 TABLET: 8.6; 5 TABLET ORAL at 10:17

## 2017-06-18 RX ADMIN — HYDROCODONE BITARTRATE AND ACETAMINOPHEN 1 TABLET: 5; 325 TABLET ORAL at 00:33

## 2017-06-18 RX ADMIN — SENNOSIDES AND DOCUSATE SODIUM 2 TABLET: 8.6; 5 TABLET ORAL at 21:02

## 2017-06-18 RX ADMIN — HEPARIN SODIUM 5000 UNITS: 5000 INJECTION, SOLUTION INTRAVENOUS; SUBCUTANEOUS at 00:32

## 2017-06-18 RX ADMIN — POTASSIUM CHLORIDE, DEXTROSE MONOHYDRATE AND SODIUM CHLORIDE: 150; 5; 450 INJECTION, SOLUTION INTRAVENOUS at 19:14

## 2017-06-18 RX ADMIN — HEPARIN SODIUM 5000 UNITS: 5000 INJECTION, SOLUTION INTRAVENOUS; SUBCUTANEOUS at 11:13

## 2017-06-18 RX ADMIN — HYDROCODONE BITARTRATE AND ACETAMINOPHEN 1 TABLET: 5; 325 TABLET ORAL at 06:26

## 2017-06-18 NOTE — PLAN OF CARE
Problem: Goal Outcome Summary  Goal: Goal Outcome Summary  Outcome: No Change  Pt A/O, VSS ex 1L O2 overnight to keep sats > 90.  Pain managed with prn norco.  Abd incision dressing with marked dried drainage, no change.  BS hypoactive, no flatus, voiding well.  Ambulated to bathroom x 2 w/SBA.  NPO.  Daughter at bedside.

## 2017-06-18 NOTE — PLAN OF CARE
Problem: Goal Outcome Summary  Goal: Goal Outcome Summary  Outcome: No Change  VSS-BP soft. Disoriented to time/place/situation at times. Baseline confusion, daughter at bedside able to help redirect. Norco for pain. NPO with ice chips/meds. Ambulated x3 SBA. BS hypo. No gas. Abd dsg dried drainage unchanged. Voiding.

## 2017-06-18 NOTE — PLAN OF CARE
Problem: Goal Outcome Summary  Goal: Goal Outcome Summary  Outcome: No Change  VSS, bowels hypo but improved from yesterday, -flatus, incision intact with staples, shower today

## 2017-06-18 NOTE — PROGRESS NOTES
"General Surgery Progress Note    Admission Date: 6/16/2017  Today's Date: 6/18/2017         Assessment:      Keara Go is a 71 year old female   S/P laparoscopic converted to open ileocecal resection  POD #2     Diagnosis: perforated retrocecal appendicitis         Plan:   Continue NPO with sips, chips and meds. Await return of bowel function. OK to shower today, ambulation. Try 2 Norco for next dose to see if pain better controlled. Continue Invanz.    Pain: norco, dilaudid for breakthrough  Bowel: rest  Antibiotics: Invanz, transition to PO at discharge  IVFs: D5 and 0.45NaCl + KCl20 @ 100ml/hr  DVT prophylaxis: PCDs, ambulation, receiving heparin    Dispo: continue to monitor, expect discharge in next couple days        Interval History:   Afebrile overnight, VSS, 1L O2 overnight needed to maintain O2 >90. Patient with history of dementia, confused here at times, family staying with her. Denies flatus. Pain is tolerable, but still 8/10 - only taking 1 norco. No nausea, chest pain or shortness of breath.          Physical Exam:   /59 (BP Location: Right arm)  Pulse 77  Temp 98.6  F (37  C) (Oral)  Resp 16  Ht 1.676 m (5' 6\")  SpO2 95%  I/O last 3 completed shifts:  In: 825 [P.O.:25; I.V.:800]  Out: 775 [Urine:775]  General: NAD, pleasant, awake and alert  Abdomen: soft, minimally tender, non distended, occasional faint bowel sounds appreciated  Incisions: clean, dry, and intact with staples in place  Extremities: no edema, no calf tenderness    LABS:  Glucose: 145  Surgical pathology pending             Linnea Suarez PA-C  [facility]: [phone]  Pager: [phone]@[email or web address] (7am-5pm)      "

## 2017-06-19 LAB — COPATH REPORT: NORMAL

## 2017-06-19 PROCEDURE — S0028 INJECTION, FAMOTIDINE, 20 MG: HCPCS | Performed by: PHYSICIAN ASSISTANT

## 2017-06-19 PROCEDURE — 25000132 ZZH RX MED GY IP 250 OP 250 PS 637: Mod: GY | Performed by: PHYSICIAN ASSISTANT

## 2017-06-19 PROCEDURE — A9270 NON-COVERED ITEM OR SERVICE: HCPCS | Mod: GY | Performed by: PHYSICIAN ASSISTANT

## 2017-06-19 PROCEDURE — 25000128 H RX IP 250 OP 636: Performed by: PHYSICIAN ASSISTANT

## 2017-06-19 PROCEDURE — 12000007 ZZH R&B INTERMEDIATE

## 2017-06-19 PROCEDURE — 25000132 ZZH RX MED GY IP 250 OP 250 PS 637: Mod: GY | Performed by: SURGERY

## 2017-06-19 PROCEDURE — 25000125 ZZHC RX 250: Performed by: PHYSICIAN ASSISTANT

## 2017-06-19 PROCEDURE — 25800025 ZZH RX 258: Performed by: PHYSICIAN ASSISTANT

## 2017-06-19 PROCEDURE — A9270 NON-COVERED ITEM OR SERVICE: HCPCS | Mod: GY | Performed by: SURGERY

## 2017-06-19 RX ORDER — TRAMADOL HYDROCHLORIDE 50 MG/1
50 TABLET ORAL EVERY 6 HOURS PRN
Status: DISCONTINUED | OUTPATIENT
Start: 2017-06-19 | End: 2017-06-20 | Stop reason: HOSPADM

## 2017-06-19 RX ORDER — ACETAMINOPHEN 325 MG/1
650 TABLET ORAL EVERY 4 HOURS PRN
Status: DISCONTINUED | OUTPATIENT
Start: 2017-06-19 | End: 2017-06-20 | Stop reason: HOSPADM

## 2017-06-19 RX ADMIN — HYDROCODONE BITARTRATE AND ACETAMINOPHEN 1 TABLET: 5; 325 TABLET ORAL at 09:01

## 2017-06-19 RX ADMIN — HYDROCODONE BITARTRATE AND ACETAMINOPHEN 1 TABLET: 5; 325 TABLET ORAL at 12:56

## 2017-06-19 RX ADMIN — FAMOTIDINE 20 MG: 10 INJECTION, SOLUTION INTRAVENOUS at 05:42

## 2017-06-19 RX ADMIN — SENNOSIDES AND DOCUSATE SODIUM 1 TABLET: 8.6; 5 TABLET ORAL at 21:31

## 2017-06-19 RX ADMIN — ACETAMINOPHEN 650 MG: 325 TABLET, FILM COATED ORAL at 19:13

## 2017-06-19 RX ADMIN — ERTAPENEM SODIUM 1 G: 1 INJECTION, POWDER, LYOPHILIZED, FOR SOLUTION INTRAMUSCULAR; INTRAVENOUS at 08:55

## 2017-06-19 RX ADMIN — POTASSIUM CHLORIDE, DEXTROSE MONOHYDRATE AND SODIUM CHLORIDE: 150; 5; 450 INJECTION, SOLUTION INTRAVENOUS at 04:54

## 2017-06-19 RX ADMIN — HYDROCODONE BITARTRATE AND ACETAMINOPHEN 1 TABLET: 5; 325 TABLET ORAL at 04:54

## 2017-06-19 RX ADMIN — FAMOTIDINE 20 MG: 10 INJECTION, SOLUTION INTRAVENOUS at 19:07

## 2017-06-19 RX ADMIN — HEPARIN SODIUM 5000 UNITS: 5000 INJECTION, SOLUTION INTRAVENOUS; SUBCUTANEOUS at 12:56

## 2017-06-19 RX ADMIN — HEPARIN SODIUM 5000 UNITS: 5000 INJECTION, SOLUTION INTRAVENOUS; SUBCUTANEOUS at 23:54

## 2017-06-19 RX ADMIN — HEPARIN SODIUM 5000 UNITS: 5000 INJECTION, SOLUTION INTRAVENOUS; SUBCUTANEOUS at 00:11

## 2017-06-19 NOTE — PLAN OF CARE
Problem: Goal Outcome Summary  Goal: Goal Outcome Summary  Outcome: Improving  A&O to self. VSS. Pain reduced with PRN norco. BS hypo. Denies Flatus. Passing very small amts of stool. Up SBA. Dtr at bedside. Surgery to round this AM.

## 2017-06-19 NOTE — PLAN OF CARE
Problem: Goal Outcome Summary  Goal: Goal Outcome Summary  Outcome: Improving  VSS, Disoriented to time, forgetful. Lung sounds clear. Bowel sounds active, passing gas. Large BM. Tolerating clear liquids. Lap sites JENNIFER- ecchymotic- staples. Norco every 4 hours PRN for pain. (patient noted to be more confused after getting Norco- will update MD). Adequate urine output.

## 2017-06-19 NOTE — PROGRESS NOTES
"[facility]  GENERAL SURGERY Progress Note    Admission Date: 2017         Assessment and Plan:   Keara Go is a 71 year old female S/P Procedure(s):  Laparoscopic converted open ileocecal resection for perforated retrocecal appendicitis, POD 3   - Clears, may be fulls for dinner if doing well  - Decrease IVF  - Norco for pain control  - Binder   - Heating pad for back  - Continue Invanz, discussed with Dr. Malave, transition to Augmentin x 7 days  - Continue Pepcid and Heparin  - Pathology pending  - Encourage ambulation and IS  - Home in 1-2 days             Interval History:   Desaturates at night, encouraged IS, weans off easily, c/o back pain but otherwise doing ok, hungry, +BM, not certain if she is passing flatus, UO adequate, ambulating.  Meds reviewed.                      Physical Exam:   Blood pressure 120/65, pulse 77, temperature 98.4  F (36.9  C), temperature source Oral, resp. rate 16, height 5' 6\" (1.676 m), SpO2 91 %.  Temperature Temp  Av.9  F (36.6  C)  Min: 97.4  F (36.3  C)  Max: 98.4  F (36.9  C)   I/O last 3 completed shifts:  In: 1636 [P.O.:50; I.V.:1586]  Out: 1350 [Urine:1350]  Constitutional:  Awake, alert, oriented, and in no apparent distress.   Lungs: No increased work of breathing, good air exchange, clear to auscultation bilaterally, and no crackles or wheezing.   Cardiovascular: Regular rate and rhythm, normal S1 and S2, and no murmur noted.   Abdomen: Soft, slightly distended, appropriately tender at incision(s), + BS.    Wound(s): Clean, dry, and intact. No erythema or drainage.    Extremities: No edema or calf tenderness. +SCDs          Data:   No new labs/imaging.     ZACK ReyezC  [facility]  [phone]  "

## 2017-06-19 NOTE — PLAN OF CARE
Problem: Goal Outcome Summary  Goal: Goal Outcome Summary  Outcome: No Change  VSS, BP soft on RA. Confused at times but easily redirected with daughter at bedside. PRN Norco for pain. Abd incisions with staples JENNIFER. Ambulating SBA. Voiding. No gas. BS hypoactive. NPO with ice chips/meds.

## 2017-06-20 VITALS
SYSTOLIC BLOOD PRESSURE: 114 MMHG | OXYGEN SATURATION: 96 % | HEART RATE: 77 BPM | RESPIRATION RATE: 16 BRPM | BODY MASS INDEX: 26.33 KG/M2 | HEIGHT: 66 IN | TEMPERATURE: 98.2 F | DIASTOLIC BLOOD PRESSURE: 62 MMHG | WEIGHT: 163.8 LBS

## 2017-06-20 LAB — PLATELET # BLD AUTO: 232 10E9/L (ref 150–450)

## 2017-06-20 PROCEDURE — 25000132 ZZH RX MED GY IP 250 OP 250 PS 637: Mod: GY | Performed by: SURGERY

## 2017-06-20 PROCEDURE — A9270 NON-COVERED ITEM OR SERVICE: HCPCS | Mod: GY

## 2017-06-20 PROCEDURE — 36415 COLL VENOUS BLD VENIPUNCTURE: CPT | Performed by: SURGERY

## 2017-06-20 PROCEDURE — A9270 NON-COVERED ITEM OR SERVICE: HCPCS | Mod: GY | Performed by: SURGERY

## 2017-06-20 PROCEDURE — A9270 NON-COVERED ITEM OR SERVICE: HCPCS | Mod: GY | Performed by: PHYSICIAN ASSISTANT

## 2017-06-20 PROCEDURE — 25000132 ZZH RX MED GY IP 250 OP 250 PS 637: Mod: GY

## 2017-06-20 PROCEDURE — 85049 AUTOMATED PLATELET COUNT: CPT | Performed by: SURGERY

## 2017-06-20 PROCEDURE — 25000132 ZZH RX MED GY IP 250 OP 250 PS 637: Mod: GY | Performed by: PHYSICIAN ASSISTANT

## 2017-06-20 RX ORDER — TRAMADOL HYDROCHLORIDE 50 MG/1
50 TABLET ORAL EVERY 6 HOURS PRN
Qty: 15 TABLET | Refills: 0 | Status: SHIPPED | OUTPATIENT
Start: 2017-06-20 | End: 2017-06-23

## 2017-06-20 RX ADMIN — RANITIDINE 150 MG: 150 TABLET ORAL at 09:53

## 2017-06-20 RX ADMIN — SENNOSIDES AND DOCUSATE SODIUM 1 TABLET: 8.6; 5 TABLET ORAL at 09:52

## 2017-06-20 RX ADMIN — ACETAMINOPHEN 650 MG: 325 TABLET, FILM COATED ORAL at 07:49

## 2017-06-20 RX ADMIN — AMOXICILLIN AND CLAVULANATE POTASSIUM 1 TABLET: 875; 125 TABLET, FILM COATED ORAL at 09:53

## 2017-06-20 NOTE — PLAN OF CARE
Problem: Goal Outcome Summary  Goal: Goal Outcome Summary  Outcome: Adequate for Discharge Date Met:  06/20/17  VSS, Alert,disoriented to time. Lung sounds clear. Bowel sounds active, passing gas. Abdominal incision Ecchymotic with staples- JENNIFER. Given abdominal binder to wear home. Dressings sent home with patient. Discharge meds and instructions discussed and given to patient and . Given low fiber handout. Denies pain. Voiding adequate amounts. Showered. Dr. Malave came up to talk to patient regarding pathology report. OK to d/c home. D/c to home with  and daughter.

## 2017-06-20 NOTE — PLAN OF CARE
Problem: Goal Outcome Summary  Goal: Goal Outcome Summary  Outcome: Improving  VSS on RA. Pt only oriented to self. Daughter at bedside very helpful with redirection. PRN tylenol for pain control. Abd incision JENNIFER with staples. Tolerating full liquids. Ambulating SBA. Voiding. +BM. +Gas.

## 2017-06-20 NOTE — PROGRESS NOTES
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 Malave to discuss further  - Encourage ambulation and IS  - Home today, discharge instructions discussed            Interval History:   Irritable, feels like she is the last to know what's going on, discussed her hospitalization at her visit yesterday, forgetful with Norco, discontinued yesterday, pain controlled with Tylenol, tolerating fulls, dislikes her food choices, showered, +Flatus/BM, UO adequate, ambulating, wants to go home.                     Physical Exam:   Blood pressure 123/64, pulse 76, temperature 98.7  F (37.1  C), temperature source Oral, resp. rate 16, height 5' 6\" (1.676 m), weight 163 lb 12.8 oz (74.3 kg), SpO2 93 %.  Temperature Temp  Av.2  F (36.8  C)  Min: 97.7  F (36.5  C)  Max: 98.7  F (37.1  C)   I/O last 3 completed shifts:  In: 360 [P.O.:360]  Out: 650 [Urine:650]  Constitutional:  Awake, alert, oriented, and in no apparent distress.   Lungs: No increased work of breathing, good air exchange, clear to auscultation bilaterally, and no crackles or wheezing.   Cardiovascular: Regular rate and rhythm, normal S1 and S2, and no murmur noted.   Abdomen: Soft, non-distended, appropriately tender at incision(s), + BS.    Wound(s): Clean, dry, and intact. No erythema or drainage.    Extremities: No edema or calf tenderness. 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

## 2017-06-20 NOTE — DISCHARGE SUMMARY
"Discharge Summary    Keara Go MRN# [id number]   YOB: 1945 Age: 71 year old     Date of Admission:  6/16/2017  Date of Discharge:  6/20/2017  Admitting Physician:  Matt Malave MD  Discharging Service:  Surgery  Primary Provider: No primary care provider on file.         Admission/Discharge Diagnosis:   Principle Diagnosis:   Appendiceal neuroendocrine and mucinous neoplasm  Acute appendicitis with localized peritonitis [K35.3]  Secondary diagnosis: None         Procedures:   Procedure(s):  Laparoscopic converted to subsequent open ileocecal resection         Brief History of Illness:   This patient was a 71 year old female who presented with abdominal pain and associated vomiting.  Imaging revealed ruptured acute appendicitis with a small amount of free intraperitoneal gas and fluid.  After discussing the risks, benefits, and possible complications, an informed consent was obtained and the patient underwent the above procedure. Please see the Operative Report for full details.         Hospital Course:   The patient recovered as anticipated.  Her diet was advanced once her bowel function returned.  Dr. Malave discussed her pathology with her.  The patient was discharged to home in stable condition by the surgical service.  She verbalized understanding of all discharge instructions.  She was asked to call with any further questions or concerns.  Please see chart for details.          Consultations:   No consultations were requested during this admission          Discharge Disposition:   Discharged to home          Condition on Discharge:   Discharge condition: Stable   Discharge vitals: Blood pressure 123/64, pulse 76, temperature 98.7  F (37.1  C), temperature source Oral, resp. rate 16, height 5' 6\" (1.676 m), weight 163 lb 12.8 oz (74.3 kg), SpO2 93 %.           Pending Results:   Pathology:    -Low grade appendiceal neuroendocrine tumor (carcinoid) with extension into " periappendiceal soft tissue (pT1a,pNx,Mx)  -Low grade appendiceal mucinous neoplasm (pT1, pNx Mx)          Discharge Medications:     Current Discharge Medication List      CONTINUE these medications which have NOT CHANGED    Details   Apoaequorin (PREVAGEN PO) Take 1 capsule by mouth daily       Omega-3 Fatty Acids (FISH OIL PO) Take 1 capsule by mouth daily       multivitamin, therapeutic with minerals (MULTI-VITAMIN) TABS tablet Take 1 tablet by mouth daily      vitamin E 400 UNIT capsule Take 400 Units by mouth daily      Augmentin 875mg 1 po twice daily x 7 days #13  Ultram 50mg 1 po every 6 hours prn for pain #15          Discharge Instructions:   Follow up with Dr. Malave in 7-10 days. Your staples will be removed at that time.  Call [phone] to schedule an appointment or if you have any concerns. We are located at [address] [place] [address].     After Care Instructions     Activity       Your activity upon discharge: activity as tolerated. No heavy lifting > 20 lbs or strenuous exercise x 3-4 weeks. No driving or alcohol while on pain meds.            Diet       Follow this diet upon discharge: low residue diet            Supplies       List the supplies the pt needs to go home: binder            Wound care and dressings       Instructions to care for your wound at home: keep wound(s) clean and dry. You may shower, but do not soak incisions x 2 weeks. Leave staples in place. Apply dry dressing as needed.]                    Nara Billy PA-C, dictating on behalf of Matt Malave MD  Office #: [phone]

## 2017-06-20 NOTE — DISCHARGE INSTRUCTIONS
Discharge Instructions following Bowel Surgery  [facility]    Diet:    As tolerated or as directed by your doctor.      Avoid gas-forming foods (such as cabbage, broccoli, onions, etc.)    Drink plenty of fluids.  Activity:    As tolerated. No heavy lifting greater than 10-15 lbs for 6 weeks.    Take frequent, short walks.    Do not drive while taking narcotic pain medications.  Care of your incision:    Ok to shower.  No tub baths or swimming until incisions are healed.    No lotions, powders or perfumes on or near incisions.    Do not remove steri-strips (white tape) as they will fall off on their own in 7-10 days.    If present, staples will be removed by the doctor in the office.  What to expect:    Feeling tired (take frequent rest periods).    Diminished appetite (eat small, frequent meals).  Call your physician ([phone]) if these signs and symptoms develop:    Fever/chills greater than 100.4 oF (taken under the tongue).    Foul smelling or green drainage from the incision.    Heavy, bright red bleeding from rectum or incision.    Pain not controlled with rest and pain medications.    Constipation lasting longer than 3 days.    With any questions or concerns.

## 2017-06-23 DIAGNOSIS — K35.30 ACUTE APPENDICITIS WITH LOCALIZED PERITONITIS: ICD-10-CM

## 2017-06-23 RX ORDER — TRAMADOL HYDROCHLORIDE 50 MG/1
50 TABLET ORAL EVERY 6 HOURS PRN
Qty: 20 TABLET | Refills: 0
Start: 2017-06-23 | End: 2017-07-27

## 2017-06-29 ENCOUNTER — OFFICE VISIT (OUTPATIENT)
Dept: SURGERY | Facility: CLINIC | Age: 72
End: 2017-06-29
Payer: COMMERCIAL

## 2017-06-29 DIAGNOSIS — Z09 SURGERY FOLLOW-UP: Primary | ICD-10-CM

## 2017-06-29 PROCEDURE — 99024 POSTOP FOLLOW-UP VISIT: CPT | Performed by: SURGERY

## 2017-06-29 NOTE — PROGRESS NOTES
Patient presents in follow-up after recent hospitalization for laparoscopic converted to open appendectomy due to a perforation of her appendix.  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  She denies fevers or chills.  She reports her bowel function has returned to baseline.  She is no longer utilizing any pain medications.  Her only complaint is that of fatigue.    On examination: Her incisions are all clean and intact.  Her staples were removed.    Overall she is progressing nicely.  We discussed her ongoing restrictions.  I'm going to refer her to oncology to discuss her pathology.  I would like to see her back again in approximately one month for surgical recheck.  Her questions were all answered to her satisfaction at this time.    Please route or send letter to:  Primary Care Provider (PCP)

## 2017-06-29 NOTE — MR AVS SNAPSHOT
"              After Visit Summary   6/29/2017    Keara Go    MRN: [id number]           Patient Information     Date Of Birth          1945        Visit Information        Provider Department      6/29/2017 9:30 AM Matt Malave MD [facility] [place] [facility]      Today's Diagnoses     Surgery follow-up    -  1      Care Instructions    You are scheduled with Dr. Jose Samuels at [facility] on 7/19/17 at 9:30am.          Follow-ups after your visit        Your next 10 appointments already scheduled     Jul 27, 2017  9:15 AM CDT   Post Op with Matt Malave MD   [facility] [place] [facility] [place]    [address]  [place] [address] [phone]              Who to contact     If you have questions or need follow up information about today's clinic visit or your schedule please contact [facility] directly at [phone].  Normal or non-critical lab and imaging results will be communicated to you by Chlorine Geniehart, letter or phone within 4 business days after the clinic has received the results. If you do not hear from us within 7 days, please contact the clinic through [employer] or phone. If you have a critical or abnormal lab result, we will notify you by phone as soon as possible.  Submit refill requests through Tipzu or call your pharmacy and they will forward the refill request to us. Please allow 3 business days for your refill to be completed.          Additional Information About Your Visit        Chlorine GenieharShopVisible Information     Tipzu lets you send messages to your doctor, view your test results, renew your prescriptions, schedule appointments and more. To sign up, go to [email or web address] . Click on \"Log in\" on the left side of the screen, which will take you to the Welcome page. Then click on \"Sign up Now\" on the right side of the page.     You will be asked to enter the " access code listed below, as well as some personal information. Please follow the directions to create your username and password.     Your access code is: [email or web address]  Expires: 2017 10:57 AM     Your access code will  in 90 days. If you need help or a new code, please call your [facility] or [phone].        Care EveryWhere ID     This is your Care EveryWhere ID. This could be used by other organizations to access your [place] medical records  [id number]         Blood Pressure from Last 3 Encounters:   17 114/62    Weight from Last 3 Encounters:   17 163 lb 12.8 oz (74.3 kg)              Today, you had the following     No orders found for display       Primary Care Provider Office Phone # Fax #    Veronica Carlos [phone]       [facility] [address]  [facility] [address]        Equal Access to Services     BORIS CHILEL : Hadii dayana pao Sopramod, waaxda luqadaha, qaybta kaalmada adeegyada, halley corado . So [facility] [phone].    ATENCIÓN: Si javila español, tiene a patrick disposición servicios gratuitos de asistencia lingüística. Llame al [phone].    We comply with applicable federal civil rights laws and [place] laws. We do not discriminate on the basis of race, color, national origin, age, disability sex, sexual orientation or gender identity.            Thank you!     Thank you for choosing [facility]  for your care. Our goal is always to provide you with excellent care. Hearing back from our patients is one way we can continue to improve our services. Please take a few minutes to complete the written survey that you may receive in the mail after your visit with us. Thank you!             Your Updated Medication List - Protect others around you: Learn how to safely use, store and throw away your medicines at www.disposemymeds.org.          This list is accurate as of: 17  9:52 AM.  Always use your  most recent med list.                   Brand Name Dispense Instructions for use Diagnosis    amoxicillin-clavulanate 875-125 MG per tablet    AUGMENTIN    13 tablet    Take 1 tablet by mouth every 12 hours    Acute appendicitis with localized peritonitis       FISH OIL PO      Take 1 capsule by mouth daily        Multi-vitamin Tabs tablet      Take 1 tablet by mouth daily        PREVAGEN PO      Take 1 capsule by mouth daily        traMADol 50 MG tablet    ULTRAM    20 tablet    Take 1 tablet (50 mg) by mouth every 6 hours as needed for moderate pain    Acute appendicitis with localized peritonitis       vitamin E 400 UNIT capsule      Take 400 Units by mouth daily

## 2017-06-29 NOTE — LETTER
2017    RE:  Keara RODRIGES Donavan-:  [birth year]    Patient presents in follow-up after recent hospitalization for laparoscopic converted to open appendectomy due to a perforation of her appendix.  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  She denies fevers or chills.  She reports her bowel function has returned to baseline.  She is no longer utilizing any pain medications.  Her only complaint is that of fatigue.     On examination: Her incisions are all clean and intact.  Her staples were removed.     Overall she is progressing nicely.  We discussed her ongoing restrictions.  I'm going to refer her to oncology to discuss her pathology.  I would like to see her back again in approximately one month for surgical recheck.  Her questions were all answered to her satisfaction at this time.    Matt Malave MD

## 2017-07-11 ENCOUNTER — OFFICE VISIT (OUTPATIENT)
Dept: SURGERY | Facility: CLINIC | Age: 72
End: 2017-07-11
Payer: COMMERCIAL

## 2017-07-11 DIAGNOSIS — Z09 SURGERY FOLLOW-UP EXAMINATION: Primary | ICD-10-CM

## 2017-07-11 PROCEDURE — 99024 POSTOP FOLLOW-UP VISIT: CPT | Performed by: SURGERY

## 2017-07-11 NOTE — MR AVS SNAPSHOT
"              After Visit Summary   7/11/2017    Keara Go    MRN: [id number]           Patient Information     Date Of Birth          1945        Visit Information        Provider Department      7/11/2017 10:15 AM Matt Malave MD [facility] [place] [facility] General Surgery      Today's Diagnoses     Surgery follow-up examination    -  1       Follow-ups after your visit        Your next 10 appointments already scheduled     Jul 27, 2017  9:15 AM CDT   Post Op with Matt Malave MD   [facility] [place] [facility] [place]    [address]  [facility] [id number] [phone]              Who to contact     If you have questions or need follow up information about today's clinic visit or your schedule please contact [facility] directly at [phone].  Normal or non-critical lab and imaging results will be communicated to you by MyChart, letter or phone within 4 business days after the clinic has received the results. If you do not hear from us within 7 days, please contact the clinic through Trackwayhart or phone. If you have a critical or abnormal lab result, we will notify you by phone as soon as possible.  Submit refill requests through hint or call your pharmacy and they will forward the refill request to us. Please allow 3 business days for your refill to be completed.          Additional Information About Your Visit        MyChart Information     hint lets you send messages to your doctor, view your test results, renew your prescriptions, schedule appointments and more. To sign up, go to [email or web address] . Click on \"Log in\" on the left side of the screen, which will take you to the Welcome page. Then click on \"Sign up Now\" on the right side of the page.     You will be asked to enter the access code listed below, as well as some personal information. Please follow the directions to create " your username and password.     Your access code is: [email or web address]  Expires: 2017 10:57 AM     Your access code will  in 90 days. If you need help or a new code, please call your [facility] or [phone].        Care EveryWhere ID     This is your Care EveryWhere ID. This could be used by other organizations to access your [place] medical records  [id number]         Blood Pressure from Last 3 Encounters:   17 114/62    Weight from Last 3 Encounters:   17 163 lb 12.8 oz (74.3 kg)              Today, you had the following     No orders found for display       Primary Care Provider Office Phone # Fax #    Veronica Carlos [phone]       [facility] [address]  [place] [address]        Equal Access to Services     [facility] : Hadii [place] elam hadasho Sopramod, waaxda luqadaha, qaybta kaalmada adejuliayada, halley corado . So [facility] [phone].    ATENCIÓN: Si habla español, tiene a patrick disposición servicios gratuitos de asistencia lingüística. Llame al [phone].    We comply with applicable federal civil rights laws and [place] laws. We do not discriminate on the basis of race, color, national origin, age, disability sex, sexual orientation or gender identity.            Thank you!     Thank you for choosing [facility]  for your care. Our goal is always to provide you with excellent care. Hearing back from our patients is one way we can continue to improve our services. Please take a few minutes to complete the written survey that you may receive in the mail after your visit with us. Thank you!             Your Updated Medication List - Protect others around you: Learn how to safely use, store and throw away your medicines at www.disposemymeds.org.          This list is accurate as of: 17 11:06 AM.  Always use your most recent med list.                   Brand Name Dispense Instructions for use Diagnosis     amoxicillin-clavulanate 875-125 MG per tablet    AUGMENTIN    13 tablet    Take 1 tablet by mouth every 12 hours    Acute appendicitis with localized peritonitis       FISH OIL PO      Take 1 capsule by mouth daily        Multi-vitamin Tabs tablet      Take 1 tablet by mouth daily        PREVAGEN PO      Take 1 capsule by mouth daily        traMADol 50 MG tablet    ULTRAM    20 tablet    Take 1 tablet (50 mg) by mouth every 6 hours as needed for moderate pain    Acute appendicitis with localized peritonitis       vitamin E 400 UNIT capsule      Take 400 Units by mouth daily

## 2017-07-11 NOTE — LETTER
2017    RE:  Keara FLOODUte Go-:  [birth year]    Patient presents in follow-up after recent laparoscopic converted to open ileocecectomy for perforated appendicitis with incidental finding of a neuroendocrine tumor.  She is concerned about the separation of her midline incision.  She has been treating this with bacitracin and a dry gauze dressing.  She has had minimal drainage.  She denies fevers or chills.  Bowel function has been normal.  She reports her only significant ongoing complaint is fatigue.     On examination: There is a superficial separation of her midline incision approximately 2 cm in length. There is a healthy granulating wound bed.  There is no surrounding erythema or cellulitis.     We discussed wound care.  Her questions were answered.  I would like to see her back in one month for recheck.    Matt Malave MD

## 2017-07-13 NOTE — PROGRESS NOTES
Patient presents in follow-up after recent laparoscopic converted to open ileocecectomy for perforated appendicitis with incidental finding of a neuroendocrine tumor.  She is concerned about the separation of her midline incision.  She has been treating this with bacitracin and a dry gauze dressing.  She has had minimal drainage.  She denies fevers or chills.  Bowel function has been normal.  She reports her only significant ongoing complaint is fatigue.    On examination: There is a superficial separation of her midline incision approximately 2 cm in length.  There is a healthy granulating wound bed.  There is no surrounding erythema or cellulitis.    We discussed wound care.  Her questions were answered.  I would like to see her back in one month for recheck.    Please route or send letter to:  Primary Care Provider (PCP)

## 2017-07-19 ENCOUNTER — TRANSFERRED RECORDS (OUTPATIENT)
Dept: HEALTH INFORMATION MANAGEMENT | Facility: CLINIC | Age: 72
End: 2017-07-19

## 2017-07-27 ENCOUNTER — OFFICE VISIT (OUTPATIENT)
Dept: SURGERY | Facility: CLINIC | Age: 72
End: 2017-07-27
Payer: COMMERCIAL

## 2017-07-27 DIAGNOSIS — Z09 SURGERY FOLLOW-UP EXAMINATION: Primary | ICD-10-CM

## 2017-07-27 PROCEDURE — 99024 POSTOP FOLLOW-UP VISIT: CPT | Performed by: SURGERY

## 2017-07-27 NOTE — MR AVS SNAPSHOT
"              After Visit Summary   2017    Keara Go    MRN: [id number]           Patient Information     Date Of Birth          1945        Visit Information        Provider Department      2017 9:15 AM Matt Malave MD [facility] [facility] General Surgery      Today's Diagnoses     Surgery follow-up examination    -  1       Follow-ups after your visit        Who to contact     If you have questions or need follow up information about today's clinic visit or your schedule please contact [facility] directly at [phone].  Normal or non-critical lab and imaging results will be communicated to you by Zet Universehart, letter or phone within 4 business days after the clinic has received the results. If you do not hear from us within 7 days, please contact the clinic through Zet Universehart or phone. If you have a critical or abnormal lab result, we will notify you by phone as soon as possible.  Submit refill requests through [employer] or call your pharmacy and they will forward the refill request to us. Please allow 3 business days for your refill to be completed.          Additional Information About Your Visit        MyChart Information     [employer] lets you send messages to your doctor, view your test results, renew your prescriptions, schedule appointments and more. To sign up, go to [email or web address] . Click on \"Log in\" on the left side of the screen, which will take you to the Welcome page. Then click on \"Sign up Now\" on the right side of the page.     You will be asked to enter the access code listed below, as well as some personal information. Please follow the directions to create your username and password.     Your access code is: [email or web address]  Expires: 2017 10:57 AM     Your access code will  in 90 days. If you need help or a new code, please call your [place] clinic or [phone].        Care EveryWhere ID     This is " your Care EveryWhere ID. This could be used by other organizations to access your [place] medical records  [id number]         Blood Pressure from Last 3 Encounters:   06/20/17 114/62    Weight from Last 3 Encounters:   06/19/17 163 lb 12.8 oz (74.3 kg)              Today, you had the following     No orders found for display       Primary Care Provider Office Phone # Fax #    Veronica Carlos [phone]       [facility] [address]  [place] [address]        Equal Access to Services     [facility]: Hadii aad ku hadasho Soomaali, waaxda luqadaha, qaybta kaalmada adeegyada, waxdemetrio brown haycornelius corado . So [facility] [phone].    ATENCIÓN: Si habla español, tiene a patrick disposición servicios gratuitos de asistencia lingüística. Ll[facility] [phone].    We comply with applicable federal civil rights laws and [place] laws. We do not discriminate on the basis of race, color, national origin, age, disability sex, sexual orientation or gender identity.            Thank you!     Thank you for choosing [facility]  for your care. Our goal is always to provide you with excellent care. Hearing back from our patients is one way we can continue to improve our services. Please take a few minutes to complete the written survey that you may receive in the mail after your visit with us. Thank you!             Your Updated Medication List - Protect others around you: Learn how to safely use, store and throw away your medicines at www.disposemymeds.org.          This list is accurate as of: 7/27/17  9:28 AM.  Always use your most recent med list.                   Brand Name Dispense Instructions for use Diagnosis    FISH OIL PO      Take 1 capsule by mouth daily        Multi-vitamin Tabs tablet      Take 1 tablet by mouth daily        PREVAGEN PO      Take 1 capsule by mouth daily        vitamin E 400 UNIT capsule      Take 400 Units by mouth daily

## 2017-07-27 NOTE — PROGRESS NOTES
Patient returns in ongoing follow-up after open ileocecal resection.  Reports that her fatigue is significantly improved.  She overall is feeling better.  Still not back to 100%.  No significant ongoing pain.  Bowels are moving normally.  No ongoing issues with her wound.    On examination: Midline incision is healed.  No evidence of infection or cellulitis.    Overall doing well.  We discussed advancement of activity.  She has followed up with oncology and is to undergo a CT scan in 3-6 months.  At this point I believe she can follow-up with me on an as-needed basis.  She is instructed that would happily see her back should questions or problems arise.    Please route or send letter to:  Primary Care Provider (PCP)

## 2017-07-27 NOTE — LETTER
2017    RE:  Keara FLOODUte Becker-:  [birth year]    Patient returns in ongoing follow-up after open ileocecal resection.  Reports that her fatigue is significantly improved.  She overall is feeling better.  Still not back to 100%.  No significant ongoing pain.  Bowels are moving normally.  No ongoing issues with her wound.     On examination: Midline incision is healed.  No evidence of infection or cellulitis.     Overall doing well.  We discussed advancement of activity.  She has followed up with oncology and is to undergo a CT scan in 3-6 months.  At this point I believe she can follow-up with me on an as-needed basis.  She is instructed that would happily see her back should questions or problems arise.      Matt Malave MD

## 2019-03-01 NOTE — PROVIDER NOTIFICATION
Page sent to Nara Billy - Family concerned that patient is more confused since receiving her Narcotic pain meds.    -- D/c Norco, PRN Tylenol, Ultram PRN, Ok to stop IVF.  
patient

## 2021-02-17 ENCOUNTER — IMMUNIZATION (OUTPATIENT)
Dept: NURSING | Facility: CLINIC | Age: 76
End: 2021-02-17
Payer: COMMERCIAL

## 2021-02-17 PROCEDURE — 91300 PR COVID VAC PFIZER DIL RECON 30 MCG/0.3 ML IM: CPT

## 2021-02-17 PROCEDURE — 0001A PR COVID VAC PFIZER DIL RECON 30 MCG/0.3 ML IM: CPT

## 2021-03-10 ENCOUNTER — IMMUNIZATION (OUTPATIENT)
Dept: NURSING | Facility: CLINIC | Age: 76
End: 2021-03-10
Attending: INTERNAL MEDICINE
Payer: COMMERCIAL

## 2021-03-10 PROCEDURE — 0002A PR COVID VAC PFIZER DIL RECON 30 MCG/0.3 ML IM: CPT

## 2021-03-10 PROCEDURE — 91300 PR COVID VAC PFIZER DIL RECON 30 MCG/0.3 ML IM: CPT

## (undated) DEVICE — DEVICE SUTURE GRASPER TROCAR CLOSURE 14GA PMITCSG

## (undated) DEVICE — ENDO CANNULA 05MM VERSAONE UNIVERSAL UNVCA5STF

## (undated) DEVICE — ENDO TROCAR OPTICAL 05MM VERSAPORT PLUS W/FIX CAN ONB5STF

## (undated) DEVICE — DRSG BANDAID 1X3" FABRIC CURITY LATEX FREE KC44101

## (undated) DEVICE — DRSG STERI STRIP 1/2X4" R1547

## (undated) DEVICE — GLOVE PROTEXIS W/NEU-THERA 8.0  2D73TE80

## (undated) DEVICE — SU VICRYL 3-0 SH 27" UND J416H

## (undated) DEVICE — STPL ENDO RELOAD ECHELON 45 WHITE 2.5MM ECR45W

## (undated) DEVICE — SU VICRYL 0 UR-6 27" J603H

## (undated) DEVICE — SUCTION CANISTER MEDIVAC LINER 3000ML W/LID 65651-530

## (undated) DEVICE — SUCTION TIP YANKAUER W/O VENT K86

## (undated) DEVICE — STPL ENDO RELOAD ECHELON 45X3.5MM BLUE ECR45B

## (undated) DEVICE — SOL NACL 0.9% IRRIG 1000ML BOTTLE 07138-09

## (undated) DEVICE — ENDO TROCAR FIRST ENTRY KII FIOS Z-THRD 12X100MM CTF73

## (undated) DEVICE — Device

## (undated) DEVICE — ESU PENCIL W/HOLSTER E2350H

## (undated) DEVICE — STPL SKIN 35W APPOSE 8886803712

## (undated) DEVICE — LINEN TOWEL PACK X5 5464

## (undated) DEVICE — NDL INSUFFLATION 120MM VERRES 172015

## (undated) DEVICE — DRSG TELFA ISLAND 4X8" 7541

## (undated) DEVICE — SU VICRYL 3-0 SH CR 8X18" J774

## (undated) DEVICE — ESU GROUND PAD UNIVERSAL W/O CORD

## (undated) DEVICE — BLADE KNIFE SURG 10 371110

## (undated) DEVICE — SU VICRYL 4-0 PS-2 18" UND J496H

## (undated) DEVICE — SUCTION TIP POOLE K770

## (undated) DEVICE — DRAPE LAP W/ARMBOARD 29410

## (undated) DEVICE — SUCTION IRR STRYKERFLOW II W/TIP 250-070-520

## (undated) DEVICE — STPL ENDO ARTICULATING 45MM EC45A

## (undated) DEVICE — GOWN IMPERVIOUS SPECIALTY XLG/XLONG 32474

## (undated) DEVICE — PACK LAP CHOLE SLC15LCFSD

## (undated) DEVICE — PREP CHLORAPREP 26ML TINTED ORANGE  260815

## (undated) DEVICE — SPONGE LAP 18X18" X8435

## (undated) DEVICE — ESU CORD MONOPOLAR 10'  E0510

## (undated) DEVICE — SOL NACL 0.9% INJ 1000ML BAG 2B1324X

## (undated) RX ORDER — FENTANYL CITRATE 50 UG/ML
INJECTION, SOLUTION INTRAMUSCULAR; INTRAVENOUS
Status: DISPENSED
Start: 2017-06-16